# Patient Record
Sex: MALE | Race: BLACK OR AFRICAN AMERICAN | NOT HISPANIC OR LATINO | Employment: FULL TIME | URBAN - METROPOLITAN AREA
[De-identification: names, ages, dates, MRNs, and addresses within clinical notes are randomized per-mention and may not be internally consistent; named-entity substitution may affect disease eponyms.]

---

## 2017-05-15 ENCOUNTER — PATIENT OUTREACH (OUTPATIENT)
Dept: ADMINISTRATIVE | Facility: HOSPITAL | Age: 57
End: 2017-05-15
Payer: COMMERCIAL

## 2017-05-15 ENCOUNTER — OFFICE VISIT (OUTPATIENT)
Dept: INTERNAL MEDICINE | Facility: CLINIC | Age: 57
End: 2017-05-15
Payer: COMMERCIAL

## 2017-05-15 ENCOUNTER — TELEPHONE (OUTPATIENT)
Dept: INTERNAL MEDICINE | Facility: CLINIC | Age: 57
End: 2017-05-15

## 2017-05-15 VITALS
BODY MASS INDEX: 27.14 KG/M2 | DIASTOLIC BLOOD PRESSURE: 104 MMHG | SYSTOLIC BLOOD PRESSURE: 172 MMHG | WEIGHT: 204.81 LBS | HEART RATE: 87 BPM | TEMPERATURE: 97 F | RESPIRATION RATE: 16 BRPM | OXYGEN SATURATION: 99 % | HEIGHT: 73 IN

## 2017-05-15 DIAGNOSIS — I73.9 PVD (PERIPHERAL VASCULAR DISEASE): ICD-10-CM

## 2017-05-15 DIAGNOSIS — Z99.2 PERITONEAL DIALYSIS CATHETER IN PLACE: ICD-10-CM

## 2017-05-15 DIAGNOSIS — I12.0 HYPERTENSION DUE TO END STAGE RENAL DISEASE ON DIALYSIS: ICD-10-CM

## 2017-05-15 DIAGNOSIS — N18.5 CHRONIC KIDNEY DISEASE, STAGE 5: Primary | ICD-10-CM

## 2017-05-15 DIAGNOSIS — Z99.2 HYPERTENSION DUE TO END STAGE RENAL DISEASE ON DIALYSIS: Primary | ICD-10-CM

## 2017-05-15 DIAGNOSIS — Z99.2 HYPERTENSION DUE TO END STAGE RENAL DISEASE ON DIALYSIS: ICD-10-CM

## 2017-05-15 DIAGNOSIS — N18.6 HYPERTENSION DUE TO END STAGE RENAL DISEASE ON DIALYSIS: ICD-10-CM

## 2017-05-15 DIAGNOSIS — N18.6 HYPERTENSION DUE TO END STAGE RENAL DISEASE ON DIALYSIS: Primary | ICD-10-CM

## 2017-05-15 DIAGNOSIS — B18.2 CHRONIC HEPATITIS C WITHOUT HEPATIC COMA: ICD-10-CM

## 2017-05-15 DIAGNOSIS — Z55.0 ILLITERATE: ICD-10-CM

## 2017-05-15 DIAGNOSIS — I12.0 HYPERTENSION DUE TO END STAGE RENAL DISEASE ON DIALYSIS: Primary | ICD-10-CM

## 2017-05-15 DIAGNOSIS — Z72.0 TOBACCO USE: ICD-10-CM

## 2017-05-15 PROCEDURE — 99204 OFFICE O/P NEW MOD 45 MIN: CPT | Mod: S$GLB,,, | Performed by: FAMILY MEDICINE

## 2017-05-15 PROCEDURE — 99999 PR PBB SHADOW E&M-EST. PATIENT-LVL III: CPT | Mod: PBBFAC,,, | Performed by: FAMILY MEDICINE

## 2017-05-15 PROCEDURE — 1160F RVW MEDS BY RX/DR IN RCRD: CPT | Mod: S$GLB,,, | Performed by: FAMILY MEDICINE

## 2017-05-15 RX ORDER — ELBASVIR AND GRAZOPREVIR 50; 100 MG/1; MG/1
1 TABLET, FILM COATED ORAL DAILY
Refills: 2 | COMMUNITY
Start: 2017-05-03 | End: 2017-07-25

## 2017-05-15 RX ORDER — CALCITRIOL 0.5 UG/1
0.5 CAPSULE ORAL
COMMUNITY
End: 2017-05-29 | Stop reason: DRUGHIGH

## 2017-05-15 RX ORDER — IBUPROFEN 200 MG
1 TABLET ORAL DAILY
Qty: 30 PATCH | Refills: 5 | Status: SHIPPED | OUTPATIENT
Start: 2017-05-15 | End: 2017-12-18 | Stop reason: ALTCHOICE

## 2017-05-15 RX ORDER — SPIRONOLACTONE 50 MG/1
50 TABLET, FILM COATED ORAL
COMMUNITY
End: 2018-02-01

## 2017-05-15 RX ORDER — LOSARTAN POTASSIUM 100 MG/1
100 TABLET ORAL DAILY
COMMUNITY
End: 2018-02-01 | Stop reason: ALTCHOICE

## 2017-05-15 RX ORDER — HYDRALAZINE HYDROCHLORIDE 10 MG/1
10 TABLET, FILM COATED ORAL 3 TIMES DAILY
Qty: 90 TABLET | Refills: 11 | Status: SHIPPED | OUTPATIENT
Start: 2017-05-15 | End: 2017-05-29 | Stop reason: DRUGHIGH

## 2017-05-15 SDOH — SOCIAL DETERMINANTS OF HEALTH (SDOH): ILITERACY AND LOW LEVEL LITERACY: Z55.0

## 2017-05-15 NOTE — PROGRESS NOTES
Subjective:       Patient ID: Rom Miner Jr. is a 57 y.o. male.    Chief Complaint: Establish Care    HPI  Here today with his wife to establish care.  She pointed out to me from the beginning that she needed to be in the exam room with him to hear instructions because he is unable to read and write and sometimes doesn't work for a with the doctors tell him.   He has a significant history for end-stage renal disease and is on peritoneal dialysis. Has been on dialysis 2 years.  Opted not to do hemodialysis due to the fact that he still works as a night watchman.  One of his main concerns at this time is the fact that he has a lot of cramps in his legs and pain when he standing for extended amount of times and it seems all those difficult to tease out, that he has pain with activity and it gets better whenever he rests.  He has never had any type of workup into the circulation of his lower extremities.  He does continue to smoke in the past had tried Chantix but had significant side effects with that.  Continues to have elevated blood pressure and there have not been any adjustments to his blood pressure medications and quite some time now.  He is adherent to what is listed here    Getting Hep C treatment with [facility]    No family history on file.    Current Outpatient Prescriptions:     amlodipine (NORVASC) 10 MG tablet, Take 10 mg by mouth once daily., Disp: , Rfl:     B&C/FERROUS FUM/FA/D3/ZINC OX (PRORENAL ORAL), Take by mouth., Disp: , Rfl:     BYSTOLIC 10 mg Tab, Take 10 mg by mouth once daily., Disp: , Rfl: 3    calcitRIOL (ROCALTROL) 0.5 MCG Cap, Take 0.5 mcg by mouth once daily., Disp: , Rfl:     calcitRIOL (ROCALTROL) 0.5 MCG Cap, Take 0.5 mcg by mouth., Disp: , Rfl:     hydrocodone-acetaminophen 10-325mg (NORCO)  mg Tab, Take 1 tablet by mouth every 6 (six) hours as needed., Disp: 20 tablet, Rfl: 0    ibuprofen (ADVIL,MOTRIN) 600 MG tablet, Take 1 tablet (600 mg total) by mouth every 8  "(eight) hours as needed for Pain., Disp: 30 tablet, Rfl: 0    losartan (COZAAR) 100 MG tablet, Take 100 mg by mouth once daily., Disp: , Rfl:     magnesium oxide (MAG-OX) 400 mg tablet, Take 400 mg by mouth once daily., Disp: , Rfl:     mirtazapine (REMERON) 15 MG tablet, Take 15 mg by mouth every evening., Disp: , Rfl:     sevelamer carbonate (RENVELA) 800 mg Tab, Take 800 mg by mouth 3 (three) times daily with meals., Disp: , Rfl:     spironolactone (ALDACTONE) 50 MG tablet, Take 50 mg by mouth., Disp: , Rfl:     ZEPATIER  mg Tab, Take 1 tablet by mouth once daily., Disp: , Rfl: 2    Review of Systems   Constitutional: Negative for chills, fever and unexpected weight change.   HENT: Negative for congestion, ear pain, sore throat and voice change.    Eyes: Negative for pain and visual disturbance.   Respiratory: Negative for cough, shortness of breath and wheezing.    Cardiovascular: Positive for leg swelling (and pain/cramping from time to time). Negative for chest pain.   Gastrointestinal: Negative for abdominal pain, nausea and vomiting.   Genitourinary: Negative for difficulty urinating.   Musculoskeletal: Positive for back pain.   Skin: Negative for rash.   Neurological: Negative for dizziness and speech difficulty.   Hematological: Does not bruise/bleed easily.   Psychiatric/Behavioral: Negative for sleep disturbance and suicidal ideas. The patient is not nervous/anxious.        Objective:   BP (!) 172/104 (BP Location: Right arm, Patient Position: Sitting, BP Method: Automatic)  Pulse 87  Temp 96.5 °F (35.8 °C) (Tympanic)   Resp 16  Ht 6' 1" (1.854 m)  Wt 92.9 kg (204 lb 12.9 oz)  SpO2 99%  BMI 27.02 kg/m2     Physical Exam   Constitutional: He is oriented to person, place, and time. He appears well-developed and well-nourished. No distress.   HENT:   Head: Normocephalic and atraumatic.   Nose: Nose normal.   Eyes: Conjunctivae and EOM are normal. Pupils are equal, round, and reactive to " light. Right eye exhibits no discharge. Left eye exhibits no discharge.   Neck: No thyromegaly present.   Cardiovascular: Normal rate, regular rhythm and normal heart sounds.    No murmur heard.  Decreased pulses of feet.  I was unable to palpate dorsalis pedis or tibialis pulses   Pulmonary/Chest: Effort normal and breath sounds normal. No respiratory distress. He has no wheezes.   Abdominal: Soft. He exhibits no distension.   Musculoskeletal: He exhibits no edema.   Neurological: He is alert and oriented to person, place, and time.   Skin: Skin is warm. No rash noted. He is not diaphoretic.   Psychiatric: He has a normal mood and affect. His behavior is normal.   Vitals reviewed.      Assessment & Plan     1. Chronic kidney disease, stage 5  He continues to use peritoneal dialysis and was told that he uses this due to the fact that he works and feels that it would be easier than hemodialysis.  Chronic kidney disease is secondary to hypertension.  He continues to have difficult to control hypertension although he says at home usually his systolic number is around 140.    2. Hypertension due to end stage renal disease on dialysis  He continues to be on multiple therapy.  We'll add a small dose of hydralazine to see if this helps with his blood pressure.  We'll have him follow-up in 2 weeks.  Explained to him that ideally his blood pressure would be in the 110 to 120s    3. Chronic hepatitis C without hepatic coma  Continue follow-up with gastroenterology for treatment monitoring.    4. Tobacco use  Expressed the need to quit smoking completely.  He is amenable to trying the nicotine patch.  Had problems with Chantix in the past.    5. PVD (peripheral vascular disease)  Suspect that some of his leg symptoms or from being on dialysis but also he has a history of someone who would likely have peripheral vascular disease with smoking and renal disease as well as exam suggestive of decreased blood flow.  We will get  JUANITA  - Cardiology Lab JUANITA Resting, Lower Extremities; Future    6. Peritoneal dialysis catheter in place  Continue follow-up with renal.

## 2017-05-15 NOTE — TELEPHONE ENCOUNTER
----- Message from Royer Ta DO sent at 5/15/2017 11:40 AM CDT -----  Please call and have kati start on 1/2 (5mg) of the 10mg hydralazine three times a day.

## 2017-05-15 NOTE — LETTER
May 15, 2017    Rom Miner JrBrenda  [address]  [facility] [address]             [facility]  [address]  [facility] [address]  Phone: [phone] Dear Mr. Miner:    [facility] is committed to your overall health.  To help you get the most out of each of your visits, we will review your information to make sure you are up to date on all of your recommended tests and/or procedures.      Royer Ta DO has found that you may be due for   Health Maintenance Due   Topic    Lipid Panel     TETANUS VACCINE     Pneumococcal PPSV23 (Medium Risk) (1)    Colonoscopy         If you have had any of the above done at another facility, please bring the records or information with you so that your record at [facility] will be complete.    If you are currently taking medication, please bring it with you to your appointment for review.    We will be happy to assist you with scheduling any necessary appointments or you may contact the [facility] appointment desk at [phone] to schedule at your convenience.     Thank you for choosing [facility] for your healthcare needs.  If you have any questions or concerns, please don't hesitate to call.    Sincerely,  Huyen LOGAN LPN Care Coordinator  [facility]  [phone]

## 2017-05-15 NOTE — PATIENT INSTRUCTIONS
Kicking the Smoking Habit  If you smoke, quitting is one of the best changes you can make for your heart and your overall health. Your risk of heart attack goes down within one day of putting out that last cigarette. As you go longer without smoking, your risk goes down even more. Quitting isnt easy, but millions of people have done it. You can, too. Its never too late to quit.  Getting started  Boost your chances of success by deciding on your quit plan. Your health care provider and cardiac rehab team can help you develop this plan. Even if youve already quit, its easy to slip back into smoking.  Your plan can help you avoid and recover from relapse.  In any case, start by setting a date to quit within a month, and do it.    Keys to your quit plan  · Talk to your healthcare provider about prescription medicines and nicotine replacement products that help stop the urge to smoke.   · Join a support group or quit smoking program. Talking with others about the challenges of quitting can help you get through them.  · Ask other smokers in your household to quit with you.  · Look for the cues in your life that you associate with smoking and avoid them.  Track your triggers  What gives you that J-vrwy-d-cigarette feeling? List all the situations that make you want a cigarette. Then think of other ways to deal with these situations. Here are some examples:  Situation How I'll handle it   Finishing a meal Get up from the table and take a walk   Having an argument Find a quiet place and breathe deeply   Feeling lonely or bored Call a friend to talk         Tips for quitting successfully  · List the benefits of quitting such as reducing heart risks and saving money. Keep this list and review it whenever you feel like smoking.  · Get support. Let your friends know you may call them to chat when you have an urge to smoke.  · If youve tried to quit before without success, this time avoid the triggers that may cause  the relapse.  · Make the most of slip-ups. Try to learn from them, and then get back on track.  · Be accountable to your friends and your calendar so that you stay on track.  For family and friends  · Be supportive and patient. Quitting smoking can be difficult and stressful.  · If you smoke, nows a great time to quit. Even if you dont quit, never smoke around your loved one. Secondhand smoke is dangerous to his or her heart.  · The best goals are accomplished in teams. Remember that when your loved one states he or she wants to stop smoking.  Date Last Reviewed: 7/1/2016  © 9377-3554 [employer]. [address] [place] [address], [place] [address]. All rights reserved. This information is not intended as a substitute for professional medical care. Always follow your healthcare professional's instructions.

## 2017-05-15 NOTE — MR AVS SNAPSHOT
[facility]  [address]  [place] [address]  Phone: [phone]                  Rom Miner Jr.   5/15/2017 10:40 AM   Office Visit    Description:  Male : 1960   Provider:  Royer Ta DO   Department:  [facility] Internal Medicine           Reason for Visit     Establish Care           Diagnoses this Visit        Comments    Chronic kidney disease, stage 5    -  Primary     Hypertension due to end stage renal disease on dialysis         Chronic hepatitis C without hepatic coma         Tobacco use         PVD (peripheral vascular disease)         Peritoneal dialysis catheter in place                To Do List           Future Appointments        Provider Department Dept Phone    2017 9:00 AM CARDIOVASCULAR, [facility] - Cardiology [phone]    2017 11:00 AM Royer Ta DO [facility] [phone]      Goals (5 Years of Data)     None      Follow-Up and Disposition     Return in about 2 weeks (around 2017).       These Medications        Disp Refills Start End    hydrALAZINE (APRESOLINE) 10 MG tablet 90 tablet 11 5/15/2017 5/15/2018    Take 1 tablet (10 mg total) by mouth 3 (three) times daily. - Oral    Pharmacy: [facility] [place] [address] Ph #: [phone]       nicotine (NICODERM CQ) 14 mg/24 hr 30 patch 5 5/15/2017     Place 1 patch onto the skin once daily. - Transdermal    [place] [facility] [place] [address] Ph #: [phone]         [facility]     [facility] Nurse Care Line -  Assistance  Unless otherwise directed by your provider, please contact [facility], our nurse care line that is available for  assistance.     Registered nurses in the [facility] provide: appointment scheduling, clinical advisement, health education, and other advisory services.  Call: [phone] (toll  free)               Medications           Message regarding Medications     Verify the changes and/or additions to your medication regime listed below are the same as discussed with your clinician today.  If any of these changes or additions are incorrect, please notify your healthcare provider.        START taking these NEW medications        Refills    hydrALAZINE (APRESOLINE) 10 MG tablet 11    Sig: Take 1 tablet (10 mg total) by mouth 3 (three) times daily.    Class: Normal    Route: Oral    nicotine (NICODERM CQ) 14 mg/24 hr 5    Sig: Place 1 patch onto the skin once daily.    Class: Normal    Route: Transdermal           Verify that the below list of medications is an accurate representation of the medications you are currently taking.  If none reported, the list may be blank. If incorrect, please contact your healthcare provider. Carry this list with you in case of emergency.           Current Medications     amlodipine (NORVASC) 10 MG tablet Take 10 mg by mouth once daily.    B&C/FERROUS FUM/FA/D3/ZINC OX (PRORENAL ORAL) Take by mouth.    BYSTOLIC 10 mg Tab Take 10 mg by mouth once daily.    calcitRIOL (ROCALTROL) 0.5 MCG Cap Take 0.5 mcg by mouth once daily.    calcitRIOL (ROCALTROL) 0.5 MCG Cap Take 0.5 mcg by mouth.    hydrocodone-acetaminophen 10-325mg (NORCO)  mg Tab Take 1 tablet by mouth every 6 (six) hours as needed.    ibuprofen (ADVIL,MOTRIN) 600 MG tablet Take 1 tablet (600 mg total) by mouth every 8 (eight) hours as needed for Pain.    losartan (COZAAR) 100 MG tablet Take 100 mg by mouth once daily.    magnesium oxide (MAG-OX) 400 mg tablet Take 400 mg by mouth once daily.    mirtazapine (REMERON) 15 MG tablet Take 15 mg by mouth every evening.    sevelamer carbonate (RENVELA) 800 mg Tab Take 800 mg by mouth 3 (three) times daily with meals.    spironolactone (ALDACTONE) 50 MG tablet Take 50 mg by mouth.    ZEPATIER  mg Tab Take 1 tablet by mouth once daily.    hydrALAZINE  "(APRESOLINE) 10 MG tablet Take 1 tablet (10 mg total) by mouth 3 (three) times daily.    nicotine (NICODERM CQ) 14 mg/24 hr Place 1 patch onto the skin once daily.           Clinical Reference Information           Your Vitals Were     BP Pulse Temp Resp    172/104 (BP Location: Right arm, Patient Position: Sitting, BP Method: Automatic) 87 96.5 °F (35.8 °C) (Tympanic) 16    Height Weight SpO2 BMI    6' 1" (1.854 m) 92.9 kg (204 lb 12.9 oz) 99% 27.02 kg/m2      Blood Pressure          Most Recent Value    BP  (!)  172/104      Allergies as of 5/15/2017     No Known Allergies      Immunizations Administered on Date of Encounter - 5/15/2017     None      Orders Placed During Today's Visit     Future Labs/Procedures Expected by Expires    Cardiology Lab JUANITA Resting, Lower Extremities  As directed 5/15/2018      [facility] Sign-Up     Activating your [facility] account is as easy as 1-2-3!     1) Visit [email or web address], select Sign Up Now, enter this activation code and your date of birth, then select Next.  [email or web address]  Expires: 6/29/2017 11:31 AM      2) Create a username and password to use when you visit [facility] in the future and select a security question in case you lose your password and select Next.    3) Enter your e-mail address and click Sign Up!    Additional Information  If you have questions, please e-mail [email or web address] or call [phone] to talk to our [facility] staff. Remember, [facility] is NOT to be used for urgent needs. For medical emergencies, dial 911.         Instructions      Kicking the Smoking Habit  If you smoke, quitting is one of the best changes you can make for your heart and your overall health. Your risk of heart attack goes down within one day of putting out that last cigarette. As you go longer without smoking, your risk goes down even more. Quitting isnt easy, but millions of people have done it. You can, too. Its never too late to quit.  Getting started  Boost your " chances of success by deciding on your quit plan. Your health care provider and cardiac rehab team can help you develop this plan. Even if youve already quit, its easy to slip back into smoking.  Your plan can help you avoid and recover from relapse.  In any case, start by setting a date to quit within a month, and do it.    Keys to your quit plan  · Talk to your healthcare provider about prescription medicines and nicotine replacement products that help stop the urge to smoke.   · Join a support group or quit smoking program. Talking with others about the challenges of quitting can help you get through them.  · Ask other smokers in your household to quit with you.  · Look for the cues in your life that you associate with smoking and avoid them.  Track your triggers  What gives you that O-wghi-t-cigarette feeling? List all the situations that make you want a cigarette. Then think of other ways to deal with these situations. Here are some examples:  Situation How I'll handle it   Finishing a meal Get up from the table and take a walk   Having an argument Find a quiet place and breathe deeply   Feeling lonely or bored Call a friend to talk         Tips for quitting successfully  · List the benefits of quitting such as reducing heart risks and saving money. Keep this list and review it whenever you feel like smoking.  · Get support. Let your friends know you may call them to chat when you have an urge to smoke.  · If youve tried to quit before without success, this time avoid the triggers that may cause the relapse.  · Make the most of slip-ups. Try to learn from them, and then get back on track.  · Be accountable to your friends and your calendar so that you stay on track.  For family and friends  · Be supportive and patient. Quitting smoking can be difficult and stressful.  · If you smoke, nows a great time to quit. Even if you dont quit, never smoke around your loved one. Secondhand smoke is dangerous to  his or her heart.  · The best goals are accomplished in teams. Remember that when your loved one states he or she wants to stop smoking.  Date Last Reviewed: 7/1/2016  © 5863-1924 [employer] [address] [place] [address], [place] [address]. All rights reserved. This information is not intended as a substitute for professional medical care. Always follow your healthcare professional's instructions.             Smoking Cessation     If you would like to quit smoking:   You may be eligible for free services if you are a [place] resident and started smoking cigarettes before September 1, 1988.  Call the Smoking Cessation Trust (SCT) toll free at (890) 587-3841 or (585) 116-8256.   Call 8-800-QUIT-NOW if you do not meet the above criteria.   Contact us via email: [email or web address]   View our website for more information: [email or web address]        Language Assistance Services     ATTENTION: Language assistance services are available, free of charge. Please call [phone].      ATENCIÓN: Si habla harsh, tiene a gonzalez disposición servicios gratuitos de asistencia lingüística. Llame al [phone].     CHÚ Ý: N?u b?n nói Ti?ng Vi?t, có các d?ch v? h? tr? ngôn ng? mi?n phí dành cho b?n. G?i s? [phone].         [facility] complies with applicable Federal civil rights laws and does not discriminate on the basis of race, color, national origin, age, disability, or sex.

## 2017-05-16 RX ORDER — LEVOCETIRIZINE DIHYDROCHLORIDE 5 MG/1
TABLET, FILM COATED ORAL
Qty: 30 TABLET | Refills: 5 | Status: SHIPPED | OUTPATIENT
Start: 2017-05-16 | End: 2017-12-15 | Stop reason: ALTCHOICE

## 2017-05-18 ENCOUNTER — TELEPHONE (OUTPATIENT)
Dept: INTERNAL MEDICINE | Facility: CLINIC | Age: 57
End: 2017-05-18

## 2017-05-18 NOTE — TELEPHONE ENCOUNTER
----- Message from Dm MORAES Frisard sent at 5/18/2017  2:48 PM CDT -----  Contact: same  Patient called in and needs to reschedule the EKG which is now scheduled for tomorrow Friday 5/19/17.  Patient would like to reschedule for either 5/25 or 5/26.    Patient call back number is: [phone]

## 2017-05-19 ENCOUNTER — HOSPITAL ENCOUNTER (OUTPATIENT)
Dept: CARDIOLOGY | Facility: CLINIC | Age: 57
Discharge: HOME | End: 2017-05-19
Payer: COMMERCIAL

## 2017-05-19 DIAGNOSIS — I73.9 PVD (PERIPHERAL VASCULAR DISEASE): ICD-10-CM

## 2017-05-19 PROCEDURE — 93922 UPR/L XTREMITY ART 2 LEVELS: CPT | Mod: S$GLB,,, | Performed by: INTERNAL MEDICINE

## 2017-05-21 LAB — VASCULAR ANKLE BRACHIAL INDEX (ABI) LEFT: 1.5 (ref 0.9–1.2)

## 2017-05-25 RX ORDER — ATORVASTATIN CALCIUM 20 MG/1
20 TABLET, FILM COATED ORAL DAILY
Qty: 90 TABLET | Refills: 3 | Status: SHIPPED | OUTPATIENT
Start: 2017-05-25 | End: 2017-12-18

## 2017-05-25 NOTE — PROGRESS NOTES
No further testing needed at this time. Should be on aspirin and statin medication. 81mg aspirin and I sent lipitor script to pharmacy. Stop smoking

## 2017-05-26 ENCOUNTER — TELEPHONE (OUTPATIENT)
Dept: INTERNAL MEDICINE | Facility: CLINIC | Age: 57
End: 2017-05-26

## 2017-05-26 NOTE — TELEPHONE ENCOUNTER
----- Message from Royer Ta DO sent at 5/25/2017  3:42 PM CDT -----  No further testing needed at this time. Should be on aspirin and statin medication. 81mg aspirin and I sent lipitor script to pharmacy. Stop smoking

## 2017-05-29 ENCOUNTER — OFFICE VISIT (OUTPATIENT)
Dept: INTERNAL MEDICINE | Facility: CLINIC | Age: 57
End: 2017-05-29
Payer: COMMERCIAL

## 2017-05-29 ENCOUNTER — TELEPHONE (OUTPATIENT)
Dept: INTERNAL MEDICINE | Facility: CLINIC | Age: 57
End: 2017-05-29

## 2017-05-29 VITALS
WEIGHT: 215.38 LBS | TEMPERATURE: 98 F | DIASTOLIC BLOOD PRESSURE: 92 MMHG | OXYGEN SATURATION: 97 % | HEIGHT: 73 IN | SYSTOLIC BLOOD PRESSURE: 156 MMHG | HEART RATE: 72 BPM | BODY MASS INDEX: 28.54 KG/M2 | RESPIRATION RATE: 18 BRPM

## 2017-05-29 DIAGNOSIS — Z72.0 TOBACCO USE: ICD-10-CM

## 2017-05-29 DIAGNOSIS — Z99.2 HYPERTENSION DUE TO END STAGE RENAL DISEASE ON DIALYSIS: ICD-10-CM

## 2017-05-29 DIAGNOSIS — I12.0 HYPERTENSION DUE TO END STAGE RENAL DISEASE ON DIALYSIS: ICD-10-CM

## 2017-05-29 DIAGNOSIS — I73.9 PVD (PERIPHERAL VASCULAR DISEASE): Primary | ICD-10-CM

## 2017-05-29 DIAGNOSIS — R60.9 DEPENDENT EDEMA: ICD-10-CM

## 2017-05-29 DIAGNOSIS — N18.6 HYPERTENSION DUE TO END STAGE RENAL DISEASE ON DIALYSIS: ICD-10-CM

## 2017-05-29 DIAGNOSIS — R60.9 DEPENDENT EDEMA: Primary | ICD-10-CM

## 2017-05-29 DIAGNOSIS — N18.5 CHRONIC KIDNEY DISEASE, STAGE 5: ICD-10-CM

## 2017-05-29 PROCEDURE — 99999 PR PBB SHADOW E&M-EST. PATIENT-LVL III: CPT | Mod: PBBFAC,,, | Performed by: FAMILY MEDICINE

## 2017-05-29 PROCEDURE — 99214 OFFICE O/P EST MOD 30 MIN: CPT | Mod: S$GLB,,, | Performed by: FAMILY MEDICINE

## 2017-05-29 RX ORDER — GENTAMICIN SULFATE 1 MG/G
CREAM TOPICAL
Refills: 2 | COMMUNITY
Start: 2017-05-18 | End: 2018-10-03

## 2017-05-29 RX ORDER — FERRIC CITRATE 210 MG/1
TABLET, COATED ORAL
Refills: 11 | COMMUNITY
Start: 2017-05-17 | End: 2018-10-03

## 2017-05-29 RX ORDER — HYDRALAZINE HYDROCHLORIDE 25 MG/1
25 TABLET, FILM COATED ORAL 3 TIMES DAILY
Refills: 3 | COMMUNITY
Start: 2017-05-17 | End: 2018-07-31 | Stop reason: SDUPTHER

## 2017-05-29 NOTE — PATIENT INSTRUCTIONS
Keep follow up with kidney doctor for further management of blood pressure.    Wear compression stockings as much as able when on feet to help with swelling and leg pain.    Take daily 81mg of asprin.

## 2017-05-29 NOTE — TELEPHONE ENCOUNTER
Pt wife call requesting order for compression stocking be sent to [facility] in [place], please reprint as i couldn't locate it to reprint. Thanks

## 2017-05-29 NOTE — TELEPHONE ENCOUNTER
No, it was faxed [facility] through [facility]. Fax: [phone] Phone: [phone]. Pt declined having it faxed to [facility].

## 2017-05-29 NOTE — PROGRESS NOTES
Subjective:       Patient ID: Rom Miner Jr. is a 57 y.o. male.    Chief Complaint: Edema (c/o edema and pain to BLE. ) and Medication Dose Change (Nephro increased Hydralzine to 25mg  TID)    HPI  Here today to follow-up from recent visit where he was seen with elevated blood pressure.  At the last visit I started him on low dose of hydralazine and since then he has been seen by nephrology and they increased the dosing to 25 mg 3 times a day.  He feels okay with that treatment.  His main concern at this time is leg cramping and swelling.  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  He does not use compression stockings.  He tries to elevate his feet when he can then he says this helps sometimes with the swelling but not so much with the cramping.  Since the last visit he has had JUANITA testing which showed noncompressible vasculature however I discussed with cardiology and the recommendation was for medical therapy.  He is on statin.  I recommended that he start on aspirin therapy.  He has quit smoking and is currently on nicotine patch.    Family History   Problem Relation Age of Onset    No Known Problems Mother     Cirrhosis Father        Current Outpatient Prescriptions:     amlodipine (NORVASC) 10 MG tablet, Take 10 mg by mouth once daily., Disp: , Rfl:     atorvastatin (LIPITOR) 20 MG tablet, Take 1 tablet (20 mg total) by mouth once daily., Disp: 90 tablet, Rfl: 3    AURYXIA 210 mg iron Tab, TAKE 2 TABLETS BY MOUTH THREE TIMES A DAY WITH MEALS AND TAKE 1 TABLET BY MOUTH TWO TIMES A DAY WITH SNACKS, Disp: , Rfl: 11    B&C/FERROUS FUM/FA/D3/ZINC OX (PRORENAL ORAL), Take by mouth., Disp: , Rfl:     BYSTOLIC 10 mg Tab, Take 10 mg by mouth once daily., Disp: , Rfl: 3    calcitRIOL (ROCALTROL) 0.5 MCG Cap, Take 0.5 mcg by mouth once daily., Disp: , Rfl:     gentamicin (GARAMYCIN) 0.1 % cream, APPLY TO EXIT SITE ONCE DAILY, Disp: , Rfl: 2    hydrALAZINE (APRESOLINE) 25 MG tablet,  Take 25 mg by mouth 3 (three) times daily., Disp: , Rfl: 3    hydrocodone-acetaminophen 10-325mg (NORCO)  mg Tab, Take 1 tablet by mouth every 6 (six) hours as needed., Disp: 20 tablet, Rfl: 0    ibuprofen (ADVIL,MOTRIN) 600 MG tablet, Take 1 tablet (600 mg total) by mouth every 8 (eight) hours as needed for Pain., Disp: 30 tablet, Rfl: 0    levocetirizine (XYZAL) 5 MG tablet, TAKE ONE TABLET BY MOUTH ONCE DAILY AS NEEDED FOR ALLERGIES, Disp: 30 tablet, Rfl: 5    losartan (COZAAR) 100 MG tablet, Take 100 mg by mouth once daily., Disp: , Rfl:     magnesium oxide (MAG-OX) 400 mg tablet, Take 400 mg by mouth once daily., Disp: , Rfl:     mirtazapine (REMERON) 15 MG tablet, Take 15 mg by mouth every evening., Disp: , Rfl:     nicotine (NICODERM CQ) 14 mg/24 hr, Place 1 patch onto the skin once daily., Disp: 30 patch, Rfl: 5    spironolactone (ALDACTONE) 50 MG tablet, Take 50 mg by mouth., Disp: , Rfl:     ZEPATIER  mg Tab, Take 1 tablet by mouth once daily., Disp: , Rfl: 2    Review of Systems   Constitutional: Negative for chills, fever and unexpected weight change.   HENT: Negative for congestion, ear pain, sore throat and voice change.    Eyes: Negative for pain and visual disturbance.   Respiratory: Negative for cough, shortness of breath and wheezing.    Cardiovascular: Positive for leg swelling (and pain/cramping from time to time). Negative for chest pain.   Gastrointestinal: Negative for abdominal pain, nausea and vomiting.   Genitourinary: Negative for difficulty urinating.   Musculoskeletal: Positive for back pain.   Skin: Negative for rash.   Neurological: Negative for dizziness and speech difficulty.   Hematological: Does not bruise/bleed easily.   Psychiatric/Behavioral: Negative for sleep disturbance and suicidal ideas. The patient is not nervous/anxious.        Objective:   BP (!) 156/92 (BP Location: Right arm, Patient Position: Sitting, BP Method: Automatic)   Pulse 72   Temp 97.8  "°F (36.6 °C) (Tympanic)   Resp 18   Ht 6' 1.2" (1.859 m)   Wt 97.7 kg (215 lb 6.2 oz)   SpO2 97%   BMI 28.26 kg/m²      Physical Exam   Constitutional: He is oriented to person, place, and time. He appears well-developed and well-nourished. No distress.   HENT:   Head: Normocephalic and atraumatic.   Nose: Nose normal.   Eyes: Conjunctivae and EOM are normal. Pupils are equal, round, and reactive to light. Right eye exhibits no discharge. Left eye exhibits no discharge.   Neck: No thyromegaly present.   Cardiovascular: Normal rate, regular rhythm and normal heart sounds.    No murmur heard.  Decreased pulses of feet.  I was unable to palpate dorsalis pedis or tibialis pulses   Pulmonary/Chest: Effort normal and breath sounds normal. No respiratory distress. He has no wheezes.   Abdominal: Soft. He exhibits no distension.   Musculoskeletal: He exhibits edema (1+ pitting edema of both legs).   Neurological: He is alert and oriented to person, place, and time. Coordination normal.   Skin: Skin is warm and dry. No rash noted. He is not diaphoretic.   Psychiatric: He has a normal mood and affect. His behavior is normal.   Vitals reviewed.      Assessment & Plan     1. PVD (peripheral vascular disease)  Discussed the results of recent JUANITA testing.  Emphasized continued smoking cessation as well as use of aspirin and statin medication.  - COMPRESSION STOCKINGS    2. Chronic kidney disease, stage 5  Continue follow-up with renal doctor.  Agree with increase of hydralazine to 25 mg 3 times daily.  Continue other medications.    3. Hypertension due to end stage renal disease on dialysis  Has follow-up within the next couple weeks with nephrology.  Emphasized the importance of this to help manage blood pressure    4. Dependent edema  Recommended use of compression stockings and for him to elevate his feet when possible  - COMPRESSION STOCKINGS    5. Tobacco use  Congratulated him on smoking cessation.  He has not smoked " in several weeks and continues to use nicotine patch

## 2017-07-05 ENCOUNTER — PATIENT OUTREACH (OUTPATIENT)
Dept: ADMINISTRATIVE | Facility: HOSPITAL | Age: 57
End: 2017-07-05

## 2017-07-25 ENCOUNTER — TELEPHONE (OUTPATIENT)
Dept: INTERNAL MEDICINE | Facility: CLINIC | Age: 57
End: 2017-07-25

## 2017-07-25 ENCOUNTER — LAB VISIT (OUTPATIENT)
Dept: LAB | Facility: HOSPITAL | Age: 57
End: 2017-07-25
Attending: FAMILY MEDICINE
Payer: COMMERCIAL

## 2017-07-25 ENCOUNTER — OFFICE VISIT (OUTPATIENT)
Dept: INTERNAL MEDICINE | Facility: CLINIC | Age: 57
End: 2017-07-25
Payer: COMMERCIAL

## 2017-07-25 VITALS
BODY MASS INDEX: 27.93 KG/M2 | SYSTOLIC BLOOD PRESSURE: 142 MMHG | OXYGEN SATURATION: 96 % | RESPIRATION RATE: 18 BRPM | DIASTOLIC BLOOD PRESSURE: 84 MMHG | TEMPERATURE: 96 F | WEIGHT: 210.75 LBS | HEIGHT: 73 IN | HEART RATE: 71 BPM

## 2017-07-25 DIAGNOSIS — M79.662 PAIN OF LEFT CALF: Primary | ICD-10-CM

## 2017-07-25 DIAGNOSIS — I73.9 PVD (PERIPHERAL VASCULAR DISEASE): ICD-10-CM

## 2017-07-25 DIAGNOSIS — B18.2 CHRONIC HEPATITIS C WITHOUT HEPATIC COMA: ICD-10-CM

## 2017-07-25 DIAGNOSIS — R25.2 LEG CRAMPS: Primary | ICD-10-CM

## 2017-07-25 DIAGNOSIS — Z99.2 STAGE 5 CHRONIC KIDNEY DISEASE ON CHRONIC DIALYSIS: ICD-10-CM

## 2017-07-25 DIAGNOSIS — N18.6 STAGE 5 CHRONIC KIDNEY DISEASE ON CHRONIC DIALYSIS: ICD-10-CM

## 2017-07-25 DIAGNOSIS — R79.89 D-DIMER, ELEVATED: ICD-10-CM

## 2017-07-25 DIAGNOSIS — R25.2 LEG CRAMPS: ICD-10-CM

## 2017-07-25 LAB
ALBUMIN SERPL BCP-MCNC: 2.6 G/DL
ALP SERPL-CCNC: 36 U/L
ALT SERPL W/O P-5'-P-CCNC: 29 U/L
ANION GAP SERPL CALC-SCNC: 15 MMOL/L
AST SERPL-CCNC: 17 U/L
BILIRUB SERPL-MCNC: 0.4 MG/DL
BUN SERPL-MCNC: 91 MG/DL
CALCIUM SERPL-MCNC: 9.1 MG/DL
CHLORIDE SERPL-SCNC: 105 MMOL/L
CO2 SERPL-SCNC: 22 MMOL/L
CREAT SERPL-MCNC: 21.4 MG/DL
D DIMER PPP IA.FEU-MCNC: 0.93 MG/L FEU
EST. GFR  (AFRICAN AMERICAN): 2.3 ML/MIN/1.73 M^2
EST. GFR  (NON AFRICAN AMERICAN): 2 ML/MIN/1.73 M^2
GLUCOSE SERPL-MCNC: 127 MG/DL
MAGNESIUM SERPL-MCNC: 2.7 MG/DL
POTASSIUM SERPL-SCNC: 5.3 MMOL/L
PROT SERPL-MCNC: 7.9 G/DL
SODIUM SERPL-SCNC: 142 MMOL/L

## 2017-07-25 PROCEDURE — 99214 OFFICE O/P EST MOD 30 MIN: CPT | Mod: S$GLB,,, | Performed by: FAMILY MEDICINE

## 2017-07-25 PROCEDURE — 99999 PR PBB SHADOW E&M-EST. PATIENT-LVL III: CPT | Mod: PBBFAC,,, | Performed by: FAMILY MEDICINE

## 2017-07-25 PROCEDURE — 36415 COLL VENOUS BLD VENIPUNCTURE: CPT | Mod: PO

## 2017-07-25 PROCEDURE — 80053 COMPREHEN METABOLIC PANEL: CPT | Mod: PO

## 2017-07-25 PROCEDURE — 85379 FIBRIN DEGRADATION QUANT: CPT | Mod: PO

## 2017-07-25 PROCEDURE — 83735 ASSAY OF MAGNESIUM: CPT | Mod: PO

## 2017-07-25 RX ORDER — FUROSEMIDE 80 MG/1
80 TABLET ORAL EVERY MORNING
Refills: 6 | COMMUNITY
Start: 2017-07-20 | End: 2018-07-31 | Stop reason: SDUPTHER

## 2017-07-25 RX ORDER — GABAPENTIN 300 MG/1
300 CAPSULE ORAL 2 TIMES DAILY
Refills: 0 | COMMUNITY
Start: 2017-06-12 | End: 2017-12-18

## 2017-07-25 RX ORDER — TRAMADOL HYDROCHLORIDE 50 MG/1
50 TABLET ORAL EVERY 4 HOURS PRN
Qty: 30 TABLET | Refills: 0 | Status: SHIPPED | OUTPATIENT
Start: 2017-07-25 | End: 2017-08-04

## 2017-07-25 NOTE — TELEPHONE ENCOUNTER
Pt wife called stating pt leg is hurting and would like something sent to pharmacy for leg pain. Please advise

## 2017-07-25 NOTE — TELEPHONE ENCOUNTER
Pt wife notified, results given u/s appt scheduled and informed Rx for pain med sent to pharmacy as per Dr. Ta. Wife voiced understanding.

## 2017-07-25 NOTE — TELEPHONE ENCOUNTER
----- Message from Royer Ta DO sent at 7/25/2017  1:04 PM CDT -----  Due to elevated D-dimer and leg pain we need to have him come in for ultrasound of leg. This has been ordered. He can come today.

## 2017-07-25 NOTE — TELEPHONE ENCOUNTER
----- Message from Susan Sanchez sent at 7/25/2017 12:11 PM CDT -----  Contact: Patients wife, Radha Garcia states that her husbands leg ins hurting real bad and was not given anything for pain on his visit. Ms Garcia is requesting something for pain be called in, please call  Her back at [phone]. Thank you

## 2017-07-25 NOTE — PROGRESS NOTES
Subjective:       Patient ID: Rom Miner Jr. is a 57 y.o. male.    Chief Complaint: Leg Pain    HPI  here today continuing to have leg pain that fluctuates but has worsened over the last week and a half. He has had this problem for quite some time and has tried to manage this by taking for his wife's muscle relaxer.  He has not been able to get compression stockings as recommended at the last visit.  Has continued to take medication to manage peripheral vascular disease and has been compliant with his peritoneal dialysis.  Is pain did keep him up last night and is worse to palpation.  He feels quite uncomfortable right now    Alexandrea is nurse with nephrology    Family History   Problem Relation Age of Onset    No Known Problems Mother     Cirrhosis Father        Current Outpatient Prescriptions:     amlodipine (NORVASC) 10 MG tablet, Take 10 mg by mouth once daily., Disp: , Rfl:     atorvastatin (LIPITOR) 20 MG tablet, Take 1 tablet (20 mg total) by mouth once daily., Disp: 90 tablet, Rfl: 3    AURYXIA 210 mg iron Tab, TAKE 2 TABLETS BY MOUTH THREE TIMES A DAY WITH MEALS AND TAKE 1 TABLET BY MOUTH TWO TIMES A DAY WITH SNACKS, Disp: , Rfl: 11    B&C/FERROUS FUM/FA/D3/ZINC OX (PRORENAL ORAL), Take by mouth., Disp: , Rfl:     BYSTOLIC 10 mg Tab, Take 10 mg by mouth once daily., Disp: , Rfl: 3    calcitRIOL (ROCALTROL) 0.5 MCG Cap, Take 0.5 mcg by mouth once daily., Disp: , Rfl:     furosemide (LASIX) 80 MG tablet, Take 80 mg by mouth every morning., Disp: , Rfl: 6    gabapentin (NEURONTIN) 300 MG capsule, Take 300 mg by mouth 2 (two) times daily., Disp: , Rfl: 0    gentamicin (GARAMYCIN) 0.1 % cream, APPLY TO EXIT SITE ONCE DAILY, Disp: , Rfl: 2    hydrALAZINE (APRESOLINE) 25 MG tablet, Take 25 mg by mouth 3 (three) times daily., Disp: , Rfl: 3    hydrocodone-acetaminophen 10-325mg (NORCO)  mg Tab, Take 1 tablet by mouth every 6 (six) hours as needed., Disp: 20 tablet, Rfl: 0    ibuprofen  "(ADVIL,MOTRIN) 600 MG tablet, Take 1 tablet (600 mg total) by mouth every 8 (eight) hours as needed for Pain., Disp: 30 tablet, Rfl: 0    levocetirizine (XYZAL) 5 MG tablet, TAKE ONE TABLET BY MOUTH ONCE DAILY AS NEEDED FOR ALLERGIES, Disp: 30 tablet, Rfl: 5    losartan (COZAAR) 100 MG tablet, Take 100 mg by mouth once daily., Disp: , Rfl:     magnesium oxide (MAG-OX) 400 mg tablet, Take 400 mg by mouth once daily., Disp: , Rfl:     mirtazapine (REMERON) 15 MG tablet, Take 15 mg by mouth every evening., Disp: , Rfl:     nicotine (NICODERM CQ) 14 mg/24 hr, Place 1 patch onto the skin once daily., Disp: 30 patch, Rfl: 5    spironolactone (ALDACTONE) 50 MG tablet, Take 50 mg by mouth., Disp: , Rfl:     Review of Systems   Constitutional: Negative for chills and fever.   Eyes: Negative for visual disturbance.   Respiratory: Negative for cough and shortness of breath.    Cardiovascular: Positive for leg swelling. Negative for chest pain.   Gastrointestinal: Negative for abdominal pain.   Musculoskeletal: Positive for gait problem and myalgias.   Neurological: Negative for dizziness.       Objective:   BP (!) 142/84 (BP Location: Left arm, Patient Position: Sitting, BP Method: Automatic)   Pulse 71   Temp 96.2 °F (35.7 °C) (Tympanic)   Resp 18   Ht 6' 1" (1.854 m)   Wt 95.6 kg (210 lb 12.2 oz)   SpO2 96%   BMI 27.81 kg/m²      Physical Exam   Constitutional: He is oriented to person, place, and time. He appears well-developed and well-nourished.   HENT:   Head: Normocephalic and atraumatic.   Eyes: Conjunctivae are normal.   Cardiovascular: Normal rate.    Pulmonary/Chest: Effort normal. No respiratory distress.   Musculoskeletal: He exhibits no edema.   No marked edema nor erythema to his legs.  He does have tenderness to palpation of his left calf.  Has a somewhat antalgic gait.   Neurological: He is alert and oriented to person, place, and time. Coordination normal.   Skin: Skin is warm and dry. No rash " noted.   Psychiatric: He has a normal mood and affect. His behavior is normal.   Vitals reviewed.      Assessment & Plan     1. Leg cramps  I'm concerned that this is secondary to his renal disease and electrolyte abnormality.  I have requested records from his nephrologist and also we will repeat laboratory studies today.  Due to the acute onset/worsening of the symptoms I'm getting a d-dimer to help delineate whether it is a blood clot because he is quite uncomfortable and tender to palpation in the left calf.  - Magnesium; Future  - Comprehensive metabolic panel; Future  - D dimer, quantitative; Future    2. Stage 5 chronic kidney disease on chronic dialysis  Requested records from nephrology.    3. Chronic hepatitis C without hepatic coma  The patient he has been cured of hepatitis C after completing treatment    4. PVD (peripheral vascular disease)  Known to have peripheral vascular disease and the current recommendation is for medical therapy

## 2017-09-05 ENCOUNTER — HOSPITAL ENCOUNTER (OUTPATIENT)
Dept: RADIOLOGY | Facility: HOSPITAL | Age: 57
Discharge: HOME | End: 2017-09-05
Attending: FAMILY MEDICINE
Payer: COMMERCIAL

## 2017-09-05 DIAGNOSIS — R79.89 D-DIMER, ELEVATED: ICD-10-CM

## 2017-09-05 DIAGNOSIS — M79.662 PAIN OF LEFT CALF: ICD-10-CM

## 2017-09-05 PROCEDURE — 93971 EXTREMITY STUDY: CPT | Mod: TC,PO

## 2017-09-05 PROCEDURE — 93971 EXTREMITY STUDY: CPT | Mod: 26,,, | Performed by: RADIOLOGY

## 2017-12-15 ENCOUNTER — TELEPHONE (OUTPATIENT)
Dept: INTERNAL MEDICINE | Facility: CLINIC | Age: 57
End: 2017-12-15

## 2017-12-15 ENCOUNTER — HOSPITAL ENCOUNTER (EMERGENCY)
Facility: HOSPITAL | Age: 57
Discharge: HOME | End: 2017-12-15
Attending: EMERGENCY MEDICINE
Payer: COMMERCIAL

## 2017-12-15 VITALS
WEIGHT: 212.63 LBS | DIASTOLIC BLOOD PRESSURE: 80 MMHG | TEMPERATURE: 98 F | RESPIRATION RATE: 20 BRPM | HEART RATE: 74 BPM | SYSTOLIC BLOOD PRESSURE: 142 MMHG | HEIGHT: 73 IN | OXYGEN SATURATION: 97 % | BODY MASS INDEX: 28.18 KG/M2

## 2017-12-15 DIAGNOSIS — R05.9 COUGH: ICD-10-CM

## 2017-12-15 DIAGNOSIS — J18.9 PNEUMONIA OF RIGHT LUNG DUE TO INFECTIOUS ORGANISM, UNSPECIFIED PART OF LUNG: Primary | ICD-10-CM

## 2017-12-15 PROCEDURE — 99284 EMERGENCY DEPT VISIT MOD MDM: CPT

## 2017-12-15 RX ORDER — LEVOFLOXACIN 500 MG/1
750 TABLET, FILM COATED ORAL DAILY
Qty: 5 TABLET | Refills: 0 | Status: SHIPPED | OUTPATIENT
Start: 2017-12-15 | End: 2017-12-15 | Stop reason: ALTCHOICE

## 2017-12-15 RX ORDER — AZITHROMYCIN 250 MG/1
250 TABLET, FILM COATED ORAL DAILY
Qty: 6 TABLET | Refills: 0 | Status: SHIPPED | OUTPATIENT
Start: 2017-12-15 | End: 2018-01-18 | Stop reason: ALTCHOICE

## 2017-12-15 RX ORDER — HYDROCODONE POLISTIREX AND CHLORPHENIRAMINE POLISTIREX 10; 8 MG/5ML; MG/5ML
2.5 SUSPENSION, EXTENDED RELEASE ORAL EVERY 12 HOURS PRN
Qty: 35 ML | Refills: 0 | Status: SHIPPED | OUTPATIENT
Start: 2017-12-15 | End: 2017-12-22

## 2017-12-15 NOTE — TELEPHONE ENCOUNTER
----- Message from Jennie Astudillo sent at 12/15/2017 12:30 PM CST -----  Contact: pt wife  She would like for the pt's medication to go to this pharmacy only. She stated that he does not use the [facility]    [facility] [address]  [address]  [place] [address]  Phone: [phone] Fax: [phone]    She can be reached at .[phone] (home)

## 2017-12-15 NOTE — ED NOTES
LOC: The patient is awake, alert and aware of environment with an appropriate affect, the patient is oriented x 3 and speaking appropriately.  APPEARANCE: Patient resting comfortably and in no acute distress, patient is clean and well groomed, patient's clothing is properly fastened.  HEENT: Brief WNL  SKIN: Brief WNL.   MUSCULOSKELETAL: Brief WNL. Except generalized body aches.   RESPIRATORY: Brief WNL. Except loose productive cough with green sputum  CARDIAC: Brief WNL  GASTRO: Brief WNL  : Brief WNL  Peripheral Vasc: Brief WNL  NEURO: Brief WNL  PSYCH: Brief WNL

## 2017-12-18 ENCOUNTER — OFFICE VISIT (OUTPATIENT)
Dept: INTERNAL MEDICINE | Facility: CLINIC | Age: 57
End: 2017-12-18
Payer: COMMERCIAL

## 2017-12-18 VITALS
HEIGHT: 73 IN | DIASTOLIC BLOOD PRESSURE: 80 MMHG | HEART RATE: 75 BPM | OXYGEN SATURATION: 96 % | WEIGHT: 206.56 LBS | BODY MASS INDEX: 27.37 KG/M2 | RESPIRATION RATE: 18 BRPM | TEMPERATURE: 96 F | SYSTOLIC BLOOD PRESSURE: 130 MMHG

## 2017-12-18 DIAGNOSIS — I12.0 HYPERTENSION DUE TO END STAGE RENAL DISEASE ON DIALYSIS: Primary | ICD-10-CM

## 2017-12-18 DIAGNOSIS — Z99.2 HYPERTENSION DUE TO END STAGE RENAL DISEASE ON DIALYSIS: Primary | ICD-10-CM

## 2017-12-18 DIAGNOSIS — J18.9 COMMUNITY ACQUIRED PNEUMONIA, UNSPECIFIED LATERALITY: ICD-10-CM

## 2017-12-18 DIAGNOSIS — N18.6 HYPERTENSION DUE TO END STAGE RENAL DISEASE ON DIALYSIS: Primary | ICD-10-CM

## 2017-12-18 PROCEDURE — 99214 OFFICE O/P EST MOD 30 MIN: CPT | Mod: S$GLB,,, | Performed by: FAMILY MEDICINE

## 2017-12-18 PROCEDURE — 99999 PR PBB SHADOW E&M-EST. PATIENT-LVL III: CPT | Mod: PBBFAC,,, | Performed by: FAMILY MEDICINE

## 2017-12-18 RX ORDER — LEVOCETIRIZINE DIHYDROCHLORIDE 5 MG/1
5 TABLET, FILM COATED ORAL NIGHTLY
COMMUNITY

## 2017-12-18 RX ORDER — ERGOCALCIFEROL 1.25 MG/1
50000 CAPSULE ORAL
COMMUNITY
End: 2018-10-03

## 2017-12-20 NOTE — ED PROVIDER NOTES
Encounter Date: 12/15/2017       History     Chief Complaint   Patient presents with    Generalized Body Aches     x 2-3 days ago, denies fever, productive cough with green sputum,      Patient currently presents with a chief complaint of cough.  Onset was noted 3 days ago.  There is little associated rhinorrhea and congestion.  Fever and chills are denied.  Vomiting and diarrhea are denied.  Over-the-counter remedies have not been attempted.  There has not been purulent nasal discharge. Cough has been productive of green sputum.  Denies SOB.            Review of patient's allergies indicates:  No Known Allergies  Past Medical History:   Diagnosis Date    Hypertension     Peritoneal dialysis status     Renal disorder      Past Surgical History:   Procedure Laterality Date    AV FISTULA PLACEMENT      CT guided Liver Biopsy  03/20/2017    [facility] stain: Positive for significant fibrosis. PAS w/database: Negative for PAS-D resistant intracellular globules. Prussian blue stain for iron: Positive for 2-3+ stainable iron deposition. Fibrous portal expansion: Present. Bridging fibrosis: Present     Family History   Problem Relation Age of Onset    No Known Problems Mother     Cirrhosis Father      Social History   Substance Use Topics    Smoking status: Former Smoker     Packs/day: 0.50    Smokeless tobacco: Never Used    Alcohol use No     Review of Systems   Constitutional: Negative for chills and fever.   HENT: Negative for postnasal drip.    Respiratory: Positive for cough. Negative for chest tightness and shortness of breath.    Cardiovascular: Negative for chest pain and leg swelling.   Gastrointestinal: Negative for abdominal pain, constipation, diarrhea, nausea and vomiting.   Genitourinary: Negative for dysuria, frequency and urgency.   Skin: Negative for color change and rash.   Allergic/Immunologic: Negative for immunocompromised state.   Neurological: Negative for weakness and  numbness.   Hematological: Negative for adenopathy. Does not bruise/bleed easily.   All other systems reviewed and are negative.      Physical Exam     Initial Vitals [12/15/17 0926]   BP Pulse Resp Temp SpO2   (!) 142/80 74 20 98.3 °F (36.8 °C) 97 %      MAP       100.67         Physical Exam    Nursing note and vitals reviewed.  Constitutional: He appears well-developed and well-nourished. He is not diaphoretic. No distress.   HENT:   Head: Normocephalic and atraumatic.   Right Ear: External ear normal.   Left Ear: External ear normal.   Nose: Nose normal.   Mouth/Throat: Oropharynx is clear and moist.   Eyes: Conjunctivae and EOM are normal. Pupils are equal, round, and reactive to light. No scleral icterus.   Neck: Neck supple. No JVD present.   Cardiovascular: Normal rate, regular rhythm, normal heart sounds and intact distal pulses. Exam reveals no gallop and no friction rub.    No murmur heard.  Pulmonary/Chest: No respiratory distress. He has wheezes. He has no rhonchi. He has no rales.   Decreased BS RIGHT mid and lower lung fields with occasional wheeze   Abdominal: Soft. Bowel sounds are normal. He exhibits no distension. There is no tenderness.   Musculoskeletal: Normal range of motion. He exhibits no edema.   Neurological: He is alert and oriented to person, place, and time.   Skin: Skin is warm and dry. No rash noted.   Psychiatric: He has a normal mood and affect. His behavior is normal.         ED Course   Procedures  Labs Reviewed - No data to display     Imaging Results          X-Ray Chest PA And Lateral (Final result)     Abnormal  Result time 12/15/17 10:15:38    Final result by León Gutierres MD (12/15/17 10:15:38)                 Impression:       Cardiomegaly.Mild pulmonary vascular congestion. Mild infiltrate is seen within the right midlung zone which could reflect airspace disease or edema.        Electronically signed by: LEÓN GUTIERRES MD  Date:     12/15/17  Time:    10:15               Narrative:    Clinical Data:SOB    Comparison:  none    Findings:  Single view of the chest.      Cardiac silhouette is enlarged. Aorta demonstrates atherosclerotic disease. Mild pulmonary vascular congestion. Mild infiltrate is seen within the right midlung zone which could reflect airspace disease or edema.  The lungs demonstrate no evidence of active disease.  No evidence of pleural effusion or pneumothorax.  Bones appear intact.                                 Medical Decision Making:   ED Management:  All findings were reviewed with the patient/family in detail along with the diagnosis of RIGHT-sided pneumonia.  At present he appears to be doing very well overall despite his diagnosis.  I see no indication of an emergent process beyond that addressed during our encounter but have duly counseled the patient/family regarding the need for prompt follow-up as well as the indications that should prompt immediate return to the emergency room should new or worrisome developments occur.  The patient/family communicates understanding of all this information and all remaining questions and concerns were addressed at this time.                       ED Course      Clinical Impression:   The primary encounter diagnosis was Pneumonia of right lung due to infectious organism, unspecified part of lung. A diagnosis of Cough was also pertinent to this visit.                           Raymond Bonner MD  12/20/17 0613

## 2017-12-26 ENCOUNTER — PATIENT OUTREACH (OUTPATIENT)
Dept: ADMINISTRATIVE | Facility: HOSPITAL | Age: 57
End: 2017-12-26

## 2018-01-18 ENCOUNTER — OFFICE VISIT (OUTPATIENT)
Dept: INTERNAL MEDICINE | Facility: CLINIC | Age: 58
End: 2018-01-18
Payer: COMMERCIAL

## 2018-01-18 ENCOUNTER — PATIENT OUTREACH (OUTPATIENT)
Dept: ADMINISTRATIVE | Facility: HOSPITAL | Age: 58
End: 2018-01-18

## 2018-01-18 ENCOUNTER — HOSPITAL ENCOUNTER (EMERGENCY)
Facility: HOSPITAL | Age: 58
End: 2018-01-18
Attending: EMERGENCY MEDICINE
Payer: COMMERCIAL

## 2018-01-18 VITALS
HEIGHT: 73 IN | DIASTOLIC BLOOD PRESSURE: 90 MMHG | RESPIRATION RATE: 22 BRPM | BODY MASS INDEX: 28.96 KG/M2 | HEART RATE: 124 BPM | SYSTOLIC BLOOD PRESSURE: 143 MMHG | OXYGEN SATURATION: 93 % | TEMPERATURE: 98 F | WEIGHT: 218.5 LBS

## 2018-01-18 VITALS
RESPIRATION RATE: 27 BRPM | HEIGHT: 73 IN | OXYGEN SATURATION: 94 % | BODY MASS INDEX: 28.76 KG/M2 | WEIGHT: 217 LBS | DIASTOLIC BLOOD PRESSURE: 109 MMHG | HEART RATE: 131 BPM | TEMPERATURE: 98 F | SYSTOLIC BLOOD PRESSURE: 174 MMHG

## 2018-01-18 DIAGNOSIS — Z99.2 ESRD ON PERITONEAL DIALYSIS: ICD-10-CM

## 2018-01-18 DIAGNOSIS — R06.02 SOB (SHORTNESS OF BREATH): Primary | ICD-10-CM

## 2018-01-18 DIAGNOSIS — I50.9 ACUTE CONGESTIVE HEART FAILURE, UNSPECIFIED CONGESTIVE HEART FAILURE TYPE: ICD-10-CM

## 2018-01-18 DIAGNOSIS — R09.02 HYPOXEMIA: ICD-10-CM

## 2018-01-18 DIAGNOSIS — R06.02 SOB (SHORTNESS OF BREATH): ICD-10-CM

## 2018-01-18 DIAGNOSIS — I48.91 ATRIAL FIBRILLATION WITH RVR: Primary | ICD-10-CM

## 2018-01-18 DIAGNOSIS — N18.6 ESRD ON PERITONEAL DIALYSIS: ICD-10-CM

## 2018-01-18 LAB
ALBUMIN SERPL BCP-MCNC: 2.6 G/DL
ALLENS TEST: ABNORMAL
ALP SERPL-CCNC: 34 U/L
ALT SERPL W/O P-5'-P-CCNC: 29 U/L
ANION GAP SERPL CALC-SCNC: 17 MMOL/L
APTT BLDCRRT: 31.6 SEC
AST SERPL-CCNC: 40 U/L
BASOPHILS # BLD AUTO: 0.02 K/UL
BASOPHILS NFR BLD: 0.2 %
BILIRUB SERPL-MCNC: 0.4 MG/DL
BNP SERPL-MCNC: 3324 PG/ML
BUN SERPL-MCNC: 102 MG/DL
CALCIUM SERPL-MCNC: 8.6 MG/DL
CHLORIDE SERPL-SCNC: 101 MMOL/L
CO2 SERPL-SCNC: 24 MMOL/L
CREAT SERPL-MCNC: 16.3 MG/DL
DELSYS: ABNORMAL
DIFFERENTIAL METHOD: ABNORMAL
EOSINOPHIL # BLD AUTO: 0.1 K/UL
EOSINOPHIL NFR BLD: 0.9 %
ERYTHROCYTE [DISTWIDTH] IN BLOOD BY AUTOMATED COUNT: 17.4 %
EST. GFR  (AFRICAN AMERICAN): 3.2 ML/MIN/1.73 M^2
EST. GFR  (NON AFRICAN AMERICAN): 2.8 ML/MIN/1.73 M^2
FIO2: 21
FLUAV AG SPEC QL IA: NEGATIVE
FLUBV AG SPEC QL IA: NEGATIVE
GLUCOSE SERPL-MCNC: 90 MG/DL
HCO3 UR-SCNC: 26.1 MMOL/L (ref 24–28)
HCT VFR BLD AUTO: 28.6 %
HGB BLD-MCNC: 9.1 G/DL
INR PPP: 1.2
LACTATE SERPL-SCNC: 0.6 MMOL/L
LIPASE SERPL-CCNC: 48 U/L
LYMPHOCYTES # BLD AUTO: 2.3 K/UL
LYMPHOCYTES NFR BLD: 22.6 %
MAGNESIUM SERPL-MCNC: 2.1 MG/DL
MCH RBC QN AUTO: 29.1 PG
MCHC RBC AUTO-ENTMCNC: 31.8 G/DL
MCV RBC AUTO: 91 FL
MODE: ABNORMAL
MONOCYTES # BLD AUTO: 1.8 K/UL
MONOCYTES NFR BLD: 17.6 %
NEUTROPHILS # BLD AUTO: 5.8 K/UL
NEUTROPHILS NFR BLD: 57.7 %
PCO2 BLDA: 32.2 MMHG (ref 35–45)
PH SMN: 7.52 [PH] (ref 7.35–7.45)
PHOSPHATE SERPL-MCNC: 7.2 MG/DL
PLATELET # BLD AUTO: 148 K/UL
PMV BLD AUTO: 10.7 FL
PO2 BLDA: 57 MMHG (ref 80–100)
POC BE: 3 MMOL/L
POC SATURATED O2: 92 % (ref 95–100)
POTASSIUM SERPL-SCNC: 4.2 MMOL/L
PROT SERPL-MCNC: 6.6 G/DL
PROTHROMBIN TIME: 12 SEC
RBC # BLD AUTO: 3.13 M/UL
SAMPLE: ABNORMAL
SITE: ABNORMAL
SODIUM SERPL-SCNC: 142 MMOL/L
SPECIMEN SOURCE: NORMAL
TROPONIN I SERPL DL<=0.01 NG/ML-MCNC: 0.08 NG/ML
TSH SERPL DL<=0.005 MIU/L-ACNC: 0.63 UIU/ML
WBC # BLD AUTO: 10.11 K/UL

## 2018-01-18 PROCEDURE — 85730 THROMBOPLASTIN TIME PARTIAL: CPT

## 2018-01-18 PROCEDURE — 36600 WITHDRAWAL OF ARTERIAL BLOOD: CPT

## 2018-01-18 PROCEDURE — 25000242 PHARM REV CODE 250 ALT 637 W/ HCPCS: Performed by: EMERGENCY MEDICINE

## 2018-01-18 PROCEDURE — 93010 ELECTROCARDIOGRAM REPORT: CPT | Mod: ,,, | Performed by: INTERNAL MEDICINE

## 2018-01-18 PROCEDURE — 25000003 PHARM REV CODE 250: Performed by: EMERGENCY MEDICINE

## 2018-01-18 PROCEDURE — 82533 TOTAL CORTISOL: CPT

## 2018-01-18 PROCEDURE — 99900035 HC TECH TIME PER 15 MIN (STAT)

## 2018-01-18 PROCEDURE — 27000221 HC OXYGEN, UP TO 24 HOURS

## 2018-01-18 PROCEDURE — 87040 BLOOD CULTURE FOR BACTERIA: CPT

## 2018-01-18 PROCEDURE — 85610 PROTHROMBIN TIME: CPT

## 2018-01-18 PROCEDURE — 99999 PR PBB SHADOW E&M-EST. PATIENT-LVL III: CPT | Mod: PBBFAC,,, | Performed by: FAMILY MEDICINE

## 2018-01-18 PROCEDURE — 84100 ASSAY OF PHOSPHORUS: CPT

## 2018-01-18 PROCEDURE — 63600175 PHARM REV CODE 636 W HCPCS: Performed by: EMERGENCY MEDICINE

## 2018-01-18 PROCEDURE — 83605 ASSAY OF LACTIC ACID: CPT

## 2018-01-18 PROCEDURE — 94640 AIRWAY INHALATION TREATMENT: CPT

## 2018-01-18 PROCEDURE — 82803 BLOOD GASES ANY COMBINATION: CPT

## 2018-01-18 PROCEDURE — 96375 TX/PRO/DX INJ NEW DRUG ADDON: CPT

## 2018-01-18 PROCEDURE — 93005 ELECTROCARDIOGRAM TRACING: CPT

## 2018-01-18 PROCEDURE — 84443 ASSAY THYROID STIM HORMONE: CPT

## 2018-01-18 PROCEDURE — 99285 EMERGENCY DEPT VISIT HI MDM: CPT | Mod: 25

## 2018-01-18 PROCEDURE — 83690 ASSAY OF LIPASE: CPT

## 2018-01-18 PROCEDURE — 85025 COMPLETE CBC W/AUTO DIFF WBC: CPT

## 2018-01-18 PROCEDURE — 87400 INFLUENZA A/B EACH AG IA: CPT | Mod: 59

## 2018-01-18 PROCEDURE — 96374 THER/PROPH/DIAG INJ IV PUSH: CPT

## 2018-01-18 PROCEDURE — 84484 ASSAY OF TROPONIN QUANT: CPT

## 2018-01-18 PROCEDURE — 99213 OFFICE O/P EST LOW 20 MIN: CPT | Mod: S$GLB,,, | Performed by: FAMILY MEDICINE

## 2018-01-18 PROCEDURE — 83735 ASSAY OF MAGNESIUM: CPT

## 2018-01-18 PROCEDURE — 80053 COMPREHEN METABOLIC PANEL: CPT

## 2018-01-18 PROCEDURE — 84145 PROCALCITONIN (PCT): CPT

## 2018-01-18 PROCEDURE — 83880 ASSAY OF NATRIURETIC PEPTIDE: CPT

## 2018-01-18 RX ORDER — IPRATROPIUM BROMIDE AND ALBUTEROL SULFATE 2.5; .5 MG/3ML; MG/3ML
3 SOLUTION RESPIRATORY (INHALATION)
Status: COMPLETED | OUTPATIENT
Start: 2018-01-18 | End: 2018-01-18

## 2018-01-18 RX ORDER — DILTIAZEM HCL/D5W 125 MG/125
10 PLASTIC BAG, INJECTION (ML) INTRAVENOUS CONTINUOUS
Status: DISCONTINUED | OUTPATIENT
Start: 2018-01-18 | End: 2018-01-19 | Stop reason: HOSPADM

## 2018-01-18 RX ORDER — FUROSEMIDE 10 MG/ML
40 INJECTION INTRAMUSCULAR; INTRAVENOUS
Status: COMPLETED | OUTPATIENT
Start: 2018-01-18 | End: 2018-01-18

## 2018-01-18 RX ORDER — DILTIAZEM HYDROCHLORIDE 5 MG/ML
10 INJECTION INTRAVENOUS
Status: COMPLETED | OUTPATIENT
Start: 2018-01-18 | End: 2018-01-18

## 2018-01-18 RX ADMIN — FUROSEMIDE 40 MG: 10 INJECTION, SOLUTION INTRAMUSCULAR; INTRAVENOUS at 09:01

## 2018-01-18 RX ADMIN — IPRATROPIUM BROMIDE AND ALBUTEROL SULFATE 3 ML: .5; 3 SOLUTION RESPIRATORY (INHALATION) at 09:01

## 2018-01-18 RX ADMIN — DILTIAZEM HYDROCHLORIDE 10 MG: 5 INJECTION INTRAVENOUS at 07:01

## 2018-01-18 RX ADMIN — IPRATROPIUM BROMIDE AND ALBUTEROL SULFATE 3 ML: .5; 3 SOLUTION RESPIRATORY (INHALATION) at 07:01

## 2018-01-18 RX ADMIN — Medication 5 MG/HR: at 10:01

## 2018-01-19 LAB
CORTIS SERPL-MCNC: 7.2 UG/DL
PROCALCITONIN SERPL IA-MCNC: 2.44 NG/ML

## 2018-01-19 NOTE — ED NOTES
Patient verbally verified and Spelled Full Name and Date of Birth.  Was sent from PMD with SOB, productive cough & body aches x past 3-4 days.  LOC: The patient is awake, alert and aware of environment with an appropriate affect, the patient is oriented x 3 and speaking appropriately.  O2 sat rm air 93%, c/o pain to upper abd & lower chest & legs.  Hx of renal failure & peritoneal dialysis x past 3yrs & hasnt skipped.   Denies fever, N,V or D.  APPEARANCE:  patient is clean and well groomed, patient's clothing is properly fastened.  HEENT: Brief WNL  SKIN: Brief WNL.   MUSCULOSKELETAL: Brief WNL except for c/o thomas leg pain  RESPIRATORY: positive for prod cough of blood streaked sputum, wheezes & SOB  CARDIAC: tachycardic at 133/min., deneis chest pain except for with coughing episodes  GASTRO: Brief WNL  : occasionally makes urine per pt  Peripheral Vasc: Brief WNL  NEURO: Brief WNL  PSYCH: Brief WNL

## 2018-01-19 NOTE — ED PROVIDER NOTES
Encounter Date: 1/18/2018       History     Chief Complaint   Patient presents with    Shortness of Breath     sent from PCP     The history is provided by the patient.   Shortness of Breath   This is a new problem. The average episode lasts 3 days. The problem occurs intermittently.The current episode started more than 2 days ago. The problem has been gradually worsening. Associated symptoms include sore throat, cough, sputum production and wheezing. Pertinent negatives include no fever, no headaches, no coryza, no rhinorrhea, no swollen glands, no ear pain, no neck pain, no hemoptysis, no PND, no orthopnea, no chest pain, no syncope, no vomiting, no abdominal pain, no rash, no leg pain, no leg swelling and no claudication. The problem's precipitants include weather changes. Treatments tried: OTC cough medicine. The treatment provided mild relief. He has had no prior hospitalizations. He has had prior ED visits (for pneumonia last month). Associated medical issues include pneumonia. Associated medical issues do not include asthma, COPD, chronic lung disease, PE, CAD, heart failure, past MI, DVT or recent surgery. Associated medical issues comments: esrd on peritoneal dialysis-has not skipped any dialysis sessions.     Review of patient's allergies indicates:  No Known Allergies  Past Medical History:   Diagnosis Date    Hypertension     Peritoneal dialysis status     Renal disorder      Past Surgical History:   Procedure Laterality Date    AV FISTULA PLACEMENT      CT guided Liver Biopsy  03/20/2017    [facility] [employer]-Trichrome stain: Positive for significant fibrosis. PAS w/database: Negative for PAS-D resistant intracellular globules. Prussian blue stain for iron: Positive for 2-3+ stainable iron deposition. Fibrous portal expansion: Present. Bridging fibrosis: Present     Family History   Problem Relation Age of Onset    No Known Problems Mother     Cirrhosis Father      Social History   Substance  Use Topics    Smoking status: Former Smoker     Packs/day: 0.50    Smokeless tobacco: Never Used    Alcohol use No     Review of Systems   Constitutional: Negative for fever.   HENT: Positive for sore throat. Negative for ear pain and rhinorrhea.    Respiratory: Positive for cough, sputum production, shortness of breath and wheezing. Negative for hemoptysis.    Cardiovascular: Negative for chest pain, orthopnea, claudication, leg swelling, syncope and PND.   Gastrointestinal: Negative for abdominal pain, nausea and vomiting.   Genitourinary: Negative for dysuria.   Musculoskeletal: Negative for back pain and neck pain.   Skin: Negative for rash.   Neurological: Negative for weakness and headaches.   Hematological: Does not bruise/bleed easily.   All other systems reviewed and are negative.      Physical Exam     Initial Vitals [01/18/18 1802]   BP Pulse Resp Temp SpO2   (!) 154/96 (!) 137 20 98.5 °F (36.9 °C) (!) 91 %      MAP       115.33         Physical Exam    Nursing note and vitals reviewed.  Constitutional: He appears well-developed and well-nourished. He is not diaphoretic. No distress.   HENT:   Head: Normocephalic and atraumatic.   Right Ear: Hearing, tympanic membrane, external ear and ear canal normal.   Left Ear: Hearing, tympanic membrane, external ear and ear canal normal.   Nose: Mucosal edema present. Right sinus exhibits no maxillary sinus tenderness and no frontal sinus tenderness. Left sinus exhibits no maxillary sinus tenderness and no frontal sinus tenderness.   Mouth/Throat: Uvula is midline, oropharynx is clear and moist and mucous membranes are normal.   Eyes: EOM are normal. Pupils are equal, round, and reactive to light. No scleral icterus.   Neck: Normal range of motion. Neck supple. No thyromegaly present.   Cardiovascular: Normal heart sounds and intact distal pulses. An irregularly irregular rhythm present. Tachycardia present.  Exam reveals no gallop and no friction rub.    No  murmur heard.  1+ edema bilateral ankles   Pulmonary/Chest: No accessory muscle usage. No respiratory distress. He has wheezes (end exp wheezing) in the right middle field, the right lower field, the left middle field and the left lower field. He has rhonchi in the right upper field, the right middle field, the right lower field, the left middle field and the left lower field. He exhibits no tenderness.   Abdominal: Soft. Bowel sounds are normal. He exhibits no distension. There is no tenderness. There is no rebound and no guarding.   Musculoskeletal: Normal range of motion. He exhibits no edema or tenderness.   Lymphadenopathy:     He has no cervical adenopathy.   Neurological: He is alert and oriented to person, place, and time. He has normal strength. No cranial nerve deficit or sensory deficit. GCS eye subscore is 4. GCS verbal subscore is 5. GCS motor subscore is 6.   Skin: Skin is warm and dry.   Psychiatric: He has a normal mood and affect. His behavior is normal. Judgment and thought content normal.         ED Course   Procedures  Labs Reviewed   B-TYPE NATRIURETIC PEPTIDE - Abnormal; Notable for the following:        Result Value    BNP 3,324 (*)     All other components within normal limits   CBC W/ AUTO DIFFERENTIAL - Abnormal; Notable for the following:     RBC 3.13 (*)     Hemoglobin 9.1 (*)     Hematocrit 28.6 (*)     MCHC 31.8 (*)     RDW 17.4 (*)     Platelets 148 (*)     Mono # 1.8 (*)     Mono% 17.6 (*)     All other components within normal limits   COMPREHENSIVE METABOLIC PANEL - Abnormal; Notable for the following:     BUN, Bld 102 (*)     Creatinine 16.3 (*)     Calcium 8.6 (*)     Albumin 2.6 (*)     Alkaline Phosphatase 34 (*)     Anion Gap 17 (*)     eGFR if  3.2 (*)     eGFR if non  2.8 (*)     All other components within normal limits   PHOSPHORUS - Abnormal; Notable for the following:     Phosphorus 7.2 (*)     All other components within normal limits    TROPONIN I - Abnormal; Notable for the following:     Troponin I 0.083 (*)     All other components within normal limits   ISTAT PROCEDURE - Abnormal; Notable for the following:     POC PH 7.517 (*)     POC PCO2 32.2 (*)     POC PO2 57 (*)     POC SATURATED O2 92 (*)     All other components within normal limits   CULTURE, BLOOD   CULTURE, BLOOD   APTT   LACTIC ACID, PLASMA   LIPASE   MAGNESIUM   PROTIME-INR   TSH   INFLUENZA A AND B ANTIGEN   PHOSPHORUS   CORTISOL, RANDOM   PROCALCITONIN     EKG Readings: (Independently Interpreted)   Initial Reading: No STEMI. Rhythm: Atrial Fibrillation. Heart Rate: 121. Ectopy: No Ectopy. Conduction: Normal. ST Segments: Normal ST Segments. T Waves: Normal. Axis: Normal. Clinical Impression: Atrial Fibrillation with RVR       Vitals:    01/18/18 1828 01/18/18 1846 01/18/18 1853 01/18/18 1903   BP: (!) 158/98 (!) 152/102  (!) 159/104   Pulse: (!) 128 (!) 127 (!) 136 (!) 127   Resp: (!) 23 (!) 23  (!) 28   Temp:       TempSrc:       SpO2: 96% 96%  95%   Weight:       Height:        01/18/18 1916 01/18/18 1925 01/18/18 1946 01/18/18 1949   BP: (!) 153/102  (!) 151/101 (!) 162/105   Pulse: (!) 127  (!) 128    Resp: (!) 24  (!) 21    Temp:  97.7 °F (36.5 °C)     TempSrc:  Oral     SpO2: (!) 93%  (!) 94%    Weight:       Height:        01/18/18 2001 01/18/18 2032 01/18/18 2047 01/18/18 2102   BP: (!) 141/87 (!) 144/94 (!) 146/96 (!) 155/103   Pulse: (!) 122 (!) 123 (!) 132 (!) 139   Resp: (!) 33 (!) 41 (!) 24 (!) 29   Temp:       TempSrc:       SpO2: (!) 92% (!) 94% 95% 95%   Weight:       Height:        01/18/18 2114 01/18/18 2217 01/18/18 2232   BP:  (!) 159/103 (!) 174/109   Pulse: (!) 133 (!) 130 (!) 131   Resp: (!) 26 (!) 30 (!) 27   Temp:      TempSrc:      SpO2: 95% (!) 92% (!) 94%   Weight:      Height:          Results for orders placed or performed during the hospital encounter of 01/18/18   APTT   Result Value Ref Range    aPTT 31.6 21.0 - 32.0 sec   Brain natriuretic  peptide   Result Value Ref Range    BNP 3,324 (H) 0 - 99 pg/mL   CBC auto differential   Result Value Ref Range    WBC 10.11 3.90 - 12.70 K/uL    RBC 3.13 (L) 4.60 - 6.20 M/uL    Hemoglobin 9.1 (L) 14.0 - 18.0 g/dL    Hematocrit 28.6 (L) 40.0 - 54.0 %    MCV 91 82 - 98 fL    MCH 29.1 27.0 - 31.0 pg    MCHC 31.8 (L) 32.0 - 36.0 g/dL    RDW 17.4 (H) 11.5 - 14.5 %    Platelets 148 (L) 150 - 350 K/uL    MPV 10.7 9.2 - 12.9 fL    Gran # 5.8 1.8 - 7.7 K/uL    Lymph # 2.3 1.0 - 4.8 K/uL    Mono # 1.8 (H) 0.3 - 1.0 K/uL    Eos # 0.1 0.0 - 0.5 K/uL    Baso # 0.02 0.00 - 0.20 K/uL    Gran% 57.7 38.0 - 73.0 %    Lymph% 22.6 18.0 - 48.0 %    Mono% 17.6 (H) 4.0 - 15.0 %    Eosinophil% 0.9 0.0 - 8.0 %    Basophil% 0.2 0.0 - 1.9 %    Differential Method Automated    Comprehensive metabolic panel   Result Value Ref Range    Sodium 142 136 - 145 mmol/L    Potassium 4.2 3.5 - 5.1 mmol/L    Chloride 101 95 - 110 mmol/L    CO2 24 23 - 29 mmol/L    Glucose 90 70 - 110 mg/dL    BUN, Bld 102 (H) 6 - 20 mg/dL    Creatinine 16.3 (H) 0.5 - 1.4 mg/dL    Calcium 8.6 (L) 8.7 - 10.5 mg/dL    Total Protein 6.6 6.0 - 8.4 g/dL    Albumin 2.6 (L) 3.5 - 5.2 g/dL    Total Bilirubin 0.4 0.1 - 1.0 mg/dL    Alkaline Phosphatase 34 (L) 55 - 135 U/L    AST 40 10 - 40 U/L    ALT 29 10 - 44 U/L    Anion Gap 17 (H) 8 - 16 mmol/L    eGFR if African American 3.2 (A) >60 mL/min/1.73 m^2    eGFR if non  2.8 (A) >60 mL/min/1.73 m^2   Lactic acid, plasma #1   Result Value Ref Range    Lactate (Lactic Acid) 0.6 0.5 - 2.2 mmol/L   Lipase   Result Value Ref Range    Lipase 48 4 - 60 U/L   Magnesium   Result Value Ref Range    Magnesium 2.1 1.6 - 2.6 mg/dL   Phosphorus   Result Value Ref Range    Phosphorus 7.2 (H) 2.7 - 4.5 mg/dL   Protime-INR   Result Value Ref Range    Prothrombin Time 12.0 9.0 - 12.5 sec    INR 1.2 0.8 - 1.2   Troponin I   Result Value Ref Range    Troponin I 0.083 (H) 0.000 - 0.026 ng/mL   TSH   Result Value Ref Range    TSH 0.632  0.400 - 4.000 uIU/mL   Influenza antigen Nasopharyngeal Swab   Result Value Ref Range    Influenza A Ag, EIA Negative Negative    Influenza B Ag, EIA Negative Negative    Flu A & B Source Nasopharyngeal Swab    ISTAT PROCEDURE   Result Value Ref Range    POC PH 7.517 (H) 7.35 - 7.45    POC PCO2 32.2 (L) 35 - 45 mmHg    POC PO2 57 (LL) 80 - 100 mmHg    POC HCO3 26.1 24 - 28 mmol/L    POC BE 3 -2 to 2 mmol/L    POC SATURATED O2 92 (L) 95 - 100 %    Sample ARTERIAL     Site RR     Allens Test Pass     DelSys Room Air     Mode SPONT     FiO2 21          Imaging Results          X-Ray Chest AP Portable (Final result)  Result time 01/18/18 19:22:29    Final result by Asya Ventura MD (Timothy) (01/18/18 19:22:29)                 Impression:     Comparison 12/15/2017.    Atherosclerosis of thoracic aorta.  Mild cardiomegaly.  Appear to be mild patchy interstitial infiltrates bilaterally likely correspond to edema.Followup is recommended.         Electronically signed by: ASYA VENTURA MD  Date:     01/18/18  Time:    19:22              Narrative:    Chest, 1 view.    Clinical History: Shortness of breath                              Medications   albuterol-ipratropium 2.5mg-0.5mg/3mL nebulizer solution 3 mL (3 mLs Nebulization Given 1/18/18 1903)   diltiaZEM injection 10 mg (10 mg Intravenous Given 1/18/18 1949)   furosemide injection 40 mg (40 mg Intravenous Given 1/18/18 2119)   albuterol-ipratropium 2.5mg-0.5mg/3mL nebulizer solution 3 mL (3 mLs Nebulization Given 1/18/18 2114)         7:33 PM - Re-evaluation:  The patient is resting comfortably and is in no acute distress. Discussed test results and notified of pending labs. Answered questions at this time. Pt states he sees Dr. Alvarez (nephrology) for his ESRD at [facility] and would like to be transferred there if he needs to be admitted.    9:45 PM - Consult: Dr. Lundy discussed the case with Dr. Oliva at [facility] ER. Will accept transfer of the patient.  Accepting  Facility/Service: [facility]   Accepting Physician: Dr. Oliva     All historical, clinical, radiographic, and laboratory findings were reviewed with the patient/family in detail along with the indications for transfer to an outside facility (rather than admission to our facility in [place]) secondary to pt request to see his nephrologist and a need for  Treatment of fluid overload and afib c rvr given the diagnosis of afib c rvr, chf exacerbation.  All remaining questions and concerns were addressed at that time and the patient/family communicates understanding and agrees to proceed accordingly.  Similarly all pertinent details of the encounter were discussed with Dr. Oliva at [facility] who agrees to accept the patient in transfer based on the needs/patient preferences outlined above.  [facility] secondary to a need for ongoing Cardizem drip, cardiac monitoring, supplimental oxygen en route.  Vinnie Lundy MD  9:46 PM    Pre-hypertension/Hypertension: The pt has been informed that they may have pre-hypertension or hypertension based on a blood pressure reading in the ED. I recommend that the pt call the PCP listed on their discharge instructions or a physician of their choice this week to arrange f/u for further evaluation of possible pre-hypertension or hypertension.     Rom Miner Jr. was given a handout which discussed their disease process, precautions, and instructions for follow-up and therapy.        Discharge Medication List as of 1/18/2018 11:18 PM             ED Diagnosis  1. Atrial fibrillation with RVR    2. SOB (shortness of breath)    3. Acute congestive heart failure, unspecified congestive heart failure type    4. ESRD on peritoneal dialysis    5. Hypoxemia                             ED Course      Clinical Impression:   The primary encounter diagnosis was Atrial fibrillation with RVR. Diagnoses of SOB (shortness of breath), Acute congestive heart failure,  unspecified congestive heart failure type, ESRD on peritoneal dialysis, and Hypoxemia were also pertinent to this visit.    Disposition:   Disposition: Transferred  Condition: Stable                        Vinnie Lundy Jr., MD  01/19/18 0203

## 2018-01-19 NOTE — ED NOTES
LOC: The patient is awake, alert and aware of environment with an appropriate affect, the patient is oriented x 3 and speaking appropriately.  APPEARANCE: Patient appears mildly distressed with shortness of breath and chest pain.   HEENT: Brief WNL  SKIN: Brief WNL.   MUSCULOSKELETAL: Patient experiencing generalized weakness and bilateral leg aches.    RESPIRATORY: Patient reports shortness of breath and is hypoxic with sats of 91.  Patient also reports coughing up bloody sputum x last few days.   CARDIAC: Reports chest discomfort, especially when he coughs and is sinus tach.    GASTRO: Brief WNL  : Brief WNL, except pt does peratineal dialysis q 3 hours and has fistula to left upper arm.   Peripheral Vasc: Brief WNL  NEURO: Brief WNL  PSYCH: Brief WNL

## 2018-01-23 NOTE — ASSESSMENT & PLAN NOTE
Usually doing pretty well on dialysis but certainly this concerns me more considering his acute illness.

## 2018-01-23 NOTE — ASSESSMENT & PLAN NOTE
Considering his dyspnea and findings on lung exam along with low oxygen saturations referred to ER for further evaluation especially considering his comorbidities of being on dialysis and recent history of pneumonia.  He appeared pretty ill and fatigued in the exam room and I felt that outpatient treatment would not be prudent

## 2018-01-23 NOTE — PROGRESS NOTES
Subjective:       Patient ID: Rom Miner Jr. is a 57 y.o. male.    Chief Complaint: Shortness of Breath and Cough    HPI  Limited today to the clinic with several days of worsening symptoms of shortness of breath, fatigue and cough.  He also has some chest discomfort.  He has a past medical history of end-stage renal disease along with congestive heart failure.  He actually did have diagnosis of pneumonia within the last month and reports that his symptoms are similar to how they were at that time.    Family History   Problem Relation Age of Onset    No Known Problems Mother     Cirrhosis Father        Current Outpatient Prescriptions:     amlodipine (NORVASC) 10 MG tablet, Take 10 mg by mouth once daily., Disp: , Rfl:     AURYXIA 210 mg iron Tab, TAKE 2 TABLETS BY MOUTH THREE TIMES A DAY WITH MEALS AND TAKE 1 TABLET BY MOUTH TWO TIMES A DAY WITH SNACKS, Disp: , Rfl: 11    B&C/FERROUS FUM/FA/D3/ZINC OX (PRORENAL ORAL), Take by mouth., Disp: , Rfl:     BYSTOLIC 10 mg Tab, Take 10 mg by mouth once daily., Disp: , Rfl: 3    calcitRIOL (ROCALTROL) 0.5 MCG Cap, Take 0.5 mcg by mouth once daily., Disp: , Rfl:     ergocalciferol (VITAMIN D2) 50,000 unit Cap, Take 50,000 Units by mouth every 30 days., Disp: , Rfl:     furosemide (LASIX) 80 MG tablet, Take 80 mg by mouth every morning., Disp: , Rfl: 6    gentamicin (GARAMYCIN) 0.1 % cream, APPLY TO EXIT SITE ONCE DAILY, Disp: , Rfl: 2    hydrALAZINE (APRESOLINE) 25 MG tablet, Take 25 mg by mouth 3 (three) times daily., Disp: , Rfl: 3    ibuprofen (ADVIL,MOTRIN) 600 MG tablet, Take 1 tablet (600 mg total) by mouth every 8 (eight) hours as needed for Pain., Disp: 30 tablet, Rfl: 0    levocetirizine (XYZAL) 5 MG tablet, Take 5 mg by mouth every evening., Disp: , Rfl:     losartan (COZAAR) 100 MG tablet, Take 100 mg by mouth once daily., Disp: , Rfl:     magnesium oxide (MAG-OX) 400 mg tablet, Take 400 mg by mouth once daily., Disp: , Rfl:     mirtazapine  "(REMERON) 15 MG tablet, Take 15 mg by mouth every evening., Disp: , Rfl:     spironolactone (ALDACTONE) 50 MG tablet, Take 50 mg by mouth., Disp: , Rfl:     Review of Systems   Constitutional: Positive for fatigue. Negative for chills and fever.   Eyes: Negative for visual disturbance.   Respiratory: Positive for cough and shortness of breath.    Cardiovascular: Negative for chest pain.   Gastrointestinal: Negative for abdominal pain.   Neurological: Negative for dizziness.       Objective:   BP (!) 143/90 (BP Location: Left arm, Patient Position: Sitting, BP Method: X-Large (Manual))   Pulse (!) 124   Temp 98 °F (36.7 °C) (Tympanic)   Resp (!) 22   Ht 6' 1" (1.854 m)   Wt 99.1 kg (218 lb 7.6 oz)   SpO2 (!) 93%   BMI 28.82 kg/m²      Physical Exam   Constitutional: He is oriented to person, place, and time. He appears well-developed and well-nourished. He appears ill. No distress.   HENT:   Head: Normocephalic and atraumatic.   Nose: Nose normal.   Eyes: Conjunctivae and EOM are normal. Pupils are equal, round, and reactive to light. Right eye exhibits no discharge. Left eye exhibits no discharge.   Neck: No thyromegaly present.   Cardiovascular: Normal rate, regular rhythm and normal heart sounds.    No murmur heard.  Pulmonary/Chest: Effort normal. No respiratory distress. He has wheezes. He has rales.   Abdominal: Soft. He exhibits no distension.   Musculoskeletal: He exhibits no edema.   Neurological: He is alert and oriented to person, place, and time.   Skin: Skin is warm. No rash noted. He is not diaphoretic.   Psychiatric: He has a normal mood and affect. His behavior is normal.   Vitals reviewed.      Assessment & Plan     Problem List Items Addressed This Visit        Renal/    ESRD on peritoneal dialysis    Current Assessment & Plan     Usually doing pretty well on dialysis but certainly this concerns me more considering his acute illness.            Other    SOB (shortness of breath) - Primary "    Current Assessment & Plan     Considering his dyspnea and findings on lung exam along with low oxygen saturations referred to ER for further evaluation especially considering his comorbidities of being on dialysis and recent history of pneumonia.  He appeared pretty ill and fatigued in the exam room and I felt that outpatient treatment would not be prudent                 No Follow-up on file.

## 2018-01-24 LAB
BACTERIA BLD CULT: NORMAL
BACTERIA BLD CULT: NORMAL

## 2018-02-01 ENCOUNTER — OFFICE VISIT (OUTPATIENT)
Dept: INTERNAL MEDICINE | Facility: CLINIC | Age: 58
End: 2018-02-01
Payer: COMMERCIAL

## 2018-02-01 VITALS
WEIGHT: 212.31 LBS | SYSTOLIC BLOOD PRESSURE: 144 MMHG | TEMPERATURE: 96 F | DIASTOLIC BLOOD PRESSURE: 80 MMHG | HEIGHT: 73 IN | OXYGEN SATURATION: 99 % | BODY MASS INDEX: 28.14 KG/M2 | HEART RATE: 77 BPM | RESPIRATION RATE: 16 BRPM

## 2018-02-01 DIAGNOSIS — I48.91 ATRIAL FIBRILLATION WITH RVR: ICD-10-CM

## 2018-02-01 DIAGNOSIS — Z99.2 ESRD ON PERITONEAL DIALYSIS: Primary | ICD-10-CM

## 2018-02-01 DIAGNOSIS — I50.9 ACUTE CONGESTIVE HEART FAILURE, UNSPECIFIED CONGESTIVE HEART FAILURE TYPE: ICD-10-CM

## 2018-02-01 DIAGNOSIS — N18.6 ESRD ON PERITONEAL DIALYSIS: Primary | ICD-10-CM

## 2018-02-01 PROBLEM — R06.02 SOB (SHORTNESS OF BREATH): Status: RESOLVED | Noted: 2018-01-18 | Resolved: 2018-02-01

## 2018-02-01 PROCEDURE — 99214 OFFICE O/P EST MOD 30 MIN: CPT | Mod: S$GLB,,, | Performed by: FAMILY MEDICINE

## 2018-02-01 PROCEDURE — 99999 PR PBB SHADOW E&M-EST. PATIENT-LVL III: CPT | Mod: PBBFAC,,, | Performed by: FAMILY MEDICINE

## 2018-02-01 PROCEDURE — 3008F BODY MASS INDEX DOCD: CPT | Mod: S$GLB,,, | Performed by: FAMILY MEDICINE

## 2018-02-01 RX ORDER — METOPROLOL SUCCINATE 25 MG/1
25 TABLET, EXTENDED RELEASE ORAL 2 TIMES DAILY
COMMUNITY
End: 2018-10-03

## 2018-02-01 RX ORDER — SPIRONOLACTONE 50 MG/1
50 TABLET, FILM COATED ORAL DAILY
Qty: 90 TABLET | Refills: 3
Start: 2018-02-01 | End: 2018-09-17 | Stop reason: DRUGHIGH

## 2018-02-01 RX ORDER — SERTRALINE HYDROCHLORIDE 50 MG/1
50 TABLET, FILM COATED ORAL DAILY
Qty: 30 TABLET | Refills: 11 | Status: SHIPPED | OUTPATIENT
Start: 2018-02-01 | End: 2018-10-29

## 2018-02-01 RX ORDER — APIXABAN 5 MG/1
5 TABLET, FILM COATED ORAL 2 TIMES DAILY
Refills: 0 | COMMUNITY
Start: 2018-01-22 | End: 2018-04-13 | Stop reason: SDUPTHER

## 2018-02-01 RX ORDER — DILTIAZEM HYDROCHLORIDE 180 MG/1
180 CAPSULE, EXTENDED RELEASE ORAL DAILY
Refills: 0 | COMMUNITY
Start: 2018-01-22 | End: 2018-03-07 | Stop reason: SDUPTHER

## 2018-02-01 RX ORDER — LOSARTAN POTASSIUM 50 MG/1
50 TABLET ORAL NIGHTLY
Refills: 3 | COMMUNITY
Start: 2018-01-23 | End: 2018-02-01 | Stop reason: ALTCHOICE

## 2018-02-01 NOTE — MEDICAL/APP STUDENT
Subjective:       Patient ID: Rom Miner Jr. is a 57 y.o. male.    Chief Complaint: Follow-up (hospital admit - fluid ) and Leg Pain (left)    Patient is presenting for follow up after admission the the ED as well as an abrasion on their leg.  Since their admission to the ED the patient has been doing well, reporting only a mild cough at this point in time.  They were not discharged on any additional medications.  2 days ago the patient was compacting garbage in a garbage can and lost balance, hitting their left leg and side.  The patient's        Review of Systems    Objective:      Physical Exam    Assessment:       No diagnosis found.    Plan:

## 2018-02-07 NOTE — ASSESSMENT & PLAN NOTE
Continue adherence to dialysis.  Recommended that he follow up with me in a couple weeks to monitor her blood pressure control.

## 2018-02-07 NOTE — ASSESSMENT & PLAN NOTE
Rate controlled at this time.  Emphasized the importance of continuing to take Eliquis to prevent competition from atrial fibrillation.  Continue on metoprolol.  Cardizem was discontinued.  They are aware of this change

## 2018-02-07 NOTE — ASSESSMENT & PLAN NOTE
Medication added to his regimen was metoprolol.  He continues to be on spironolactone and Lasix.  No rales appreciated on my exam at this time.  Seems to be stabilized.

## 2018-02-07 NOTE — PROGRESS NOTES
Subjective:       Patient ID: Rom Miner Jr. is a 57 y.o. male.    Chief Complaint: Follow-up (hospital admit - fluid ) and Leg Pain (left)    HPI  Mr. Miner is here today to follow-up from recent hospitalization where he was found to have atrial fibrillation of new onset with concomitant CHF.  He has responded well and medication changes have been instituted including addition of Eliquis.  Not having any shortness of breath nor chest pain.  His wife is expressing concern that he seems more depressed and really just hasn't been doing as much as he used to do.  She says that she used to be upset when he was always hanging out with his friends and going places but now she wishes he would do more of that because he just seems to want to sit around the house and not do anything.    Family History   Problem Relation Age of Onset    No Known Problems Mother     Cirrhosis Father        Current Outpatient Prescriptions:     AURYXIA 210 mg iron Tab, TAKE 2 TABLETS BY MOUTH THREE TIMES A DAY WITH MEALS AND TAKE 1 TABLET BY MOUTH TWO TIMES A DAY WITH SNACKS, Disp: , Rfl: 11    calcitRIOL (ROCALTROL) 0.5 MCG Cap, Take 0.5 mcg by mouth once daily., Disp: , Rfl:     CARTIA  mg 24 hr capsule, Take 180 mg by mouth once daily., Disp: , Rfl: 0    ELIQUIS 5 mg Tab, Take 5 mg by mouth 2 (two) times daily., Disp: , Rfl: 0    furosemide (LASIX) 80 MG tablet, Take 80 mg by mouth every morning., Disp: , Rfl: 6    hydrALAZINE (APRESOLINE) 25 MG tablet, Take 25 mg by mouth 3 (three) times daily., Disp: , Rfl: 3    levocetirizine (XYZAL) 5 MG tablet, Take 5 mg by mouth every evening., Disp: , Rfl:     metoprolol succinate (TOPROL-XL) 25 MG 24 hr tablet, Take 25 mg by mouth once daily., Disp: , Rfl:     mirtazapine (REMERON) 15 MG tablet, Take 15 mg by mouth every evening., Disp: , Rfl:     amlodipine (NORVASC) 10 MG tablet, Take 10 mg by mouth once daily., Disp: , Rfl:     B&C/FERROUS FUM/FA/D3/ZINC OX (PRORENAL  "ORAL), Take by mouth., Disp: , Rfl:     ergocalciferol (VITAMIN D2) 50,000 unit Cap, Take 50,000 Units by mouth every 30 days., Disp: , Rfl:     gentamicin (GARAMYCIN) 0.1 % cream, APPLY TO EXIT SITE ONCE DAILY, Disp: , Rfl: 2    ibuprofen (ADVIL,MOTRIN) 600 MG tablet, Take 1 tablet (600 mg total) by mouth every 8 (eight) hours as needed for Pain., Disp: 30 tablet, Rfl: 0    magnesium oxide (MAG-OX) 400 mg tablet, Take 400 mg by mouth once daily., Disp: , Rfl:     sertraline (ZOLOFT) 50 MG tablet, Take 1 tablet (50 mg total) by mouth once daily. For depression, Disp: 30 tablet, Rfl: 11    spironolactone (ALDACTONE) 50 MG tablet, Take 1 tablet (50 mg total) by mouth once daily., Disp: 90 tablet, Rfl: 3    Review of Systems   Constitutional: Positive for fatigue. Negative for chills and fever.   Eyes: Negative for visual disturbance.   Respiratory: Negative for cough and shortness of breath.    Cardiovascular: Negative for chest pain.   Gastrointestinal: Negative for abdominal pain.   Neurological: Negative for dizziness.   Psychiatric/Behavioral: Positive for dysphoric mood. Negative for decreased concentration and sleep disturbance. The patient is not nervous/anxious.        Objective:   BP (!) 144/80 (BP Location: Right arm, Patient Position: Sitting, BP Method: Large (Manual))   Pulse 77   Temp 96.4 °F (35.8 °C) (Tympanic)   Resp 16   Ht 6' 1" (1.854 m)   Wt 96.3 kg (212 lb 4.9 oz)   SpO2 99%   BMI 28.01 kg/m²      Physical Exam   Constitutional: He is oriented to person, place, and time. He appears well-developed and well-nourished. No distress.   HENT:   Head: Normocephalic and atraumatic.   Nose: Nose normal.   Eyes: Conjunctivae and EOM are normal. Pupils are equal, round, and reactive to light. Right eye exhibits no discharge. Left eye exhibits no discharge.   Neck: No thyromegaly present.   Cardiovascular: Normal rate, regular rhythm and normal heart sounds.    No murmur heard.  Pulmonary/Chest: " Effort normal and breath sounds normal. No respiratory distress. He has no wheezes.   Abdominal: Soft. He exhibits no distension.   Musculoskeletal: He exhibits no edema.   Neurological: He is alert and oriented to person, place, and time.   Skin: Skin is warm. No rash noted. He is not diaphoretic.   Psychiatric: His behavior is normal.   Appeared to be down and was hanging his head a lot. Says that he was tired.    Vitals reviewed.      Assessment & Plan     Problem List Items Addressed This Visit        Cardiac/Vascular    Atrial fibrillation with RVR    Current Assessment & Plan     Rate controlled at this time.  Emphasized the importance of continuing to take Eliquis to prevent competition from atrial fibrillation.  Continue on metoprolol.  Cardizem was discontinued.  They are aware of this change         Acute congestive heart failure    Current Assessment & Plan     Medication added to his regimen was metoprolol.  He continues to be on spironolactone and Lasix.  No rales appreciated on my exam at this time.  Seems to be stabilized.            Renal/    ESRD on peritoneal dialysis - Primary    Current Assessment & Plan     Continue adherence to dialysis.  Recommended that he follow up with me in a couple weeks to monitor her blood pressure control.                 Follow-up in about 2 weeks (around 2/15/2018) for Blood pressure monitoring.

## 2018-02-15 ENCOUNTER — PATIENT OUTREACH (OUTPATIENT)
Dept: ADMINISTRATIVE | Facility: HOSPITAL | Age: 58
End: 2018-02-15

## 2018-02-15 ENCOUNTER — OFFICE VISIT (OUTPATIENT)
Dept: INTERNAL MEDICINE | Facility: CLINIC | Age: 58
End: 2018-02-15
Payer: COMMERCIAL

## 2018-02-15 ENCOUNTER — LAB VISIT (OUTPATIENT)
Dept: LAB | Facility: HOSPITAL | Age: 58
End: 2018-02-15
Attending: FAMILY MEDICINE
Payer: COMMERCIAL

## 2018-02-15 VITALS
TEMPERATURE: 97 F | BODY MASS INDEX: 26.91 KG/M2 | HEART RATE: 73 BPM | OXYGEN SATURATION: 96 % | HEIGHT: 73 IN | RESPIRATION RATE: 16 BRPM | DIASTOLIC BLOOD PRESSURE: 78 MMHG | SYSTOLIC BLOOD PRESSURE: 140 MMHG | WEIGHT: 203.06 LBS

## 2018-02-15 DIAGNOSIS — F32.A DEPRESSION, UNSPECIFIED DEPRESSION TYPE: ICD-10-CM

## 2018-02-15 DIAGNOSIS — I50.9 ACUTE CONGESTIVE HEART FAILURE, UNSPECIFIED CONGESTIVE HEART FAILURE TYPE: ICD-10-CM

## 2018-02-15 DIAGNOSIS — K52.9 CHRONIC DIARRHEA: Primary | ICD-10-CM

## 2018-02-15 PROCEDURE — 99999 PR PBB SHADOW E&M-EST. PATIENT-LVL III: CPT | Mod: PBBFAC,,, | Performed by: FAMILY MEDICINE

## 2018-02-15 PROCEDURE — 80061 LIPID PANEL: CPT

## 2018-02-15 PROCEDURE — 3008F BODY MASS INDEX DOCD: CPT | Mod: S$GLB,,, | Performed by: FAMILY MEDICINE

## 2018-02-15 PROCEDURE — 99214 OFFICE O/P EST MOD 30 MIN: CPT | Mod: S$GLB,,, | Performed by: FAMILY MEDICINE

## 2018-02-15 PROCEDURE — 36415 COLL VENOUS BLD VENIPUNCTURE: CPT | Mod: PO

## 2018-02-15 NOTE — PROGRESS NOTES
Subjective:       Patient ID: Rom Miner Jr. is a 57 y.o. male.    Chief Complaint: Follow-up (blood pressure)    HPI  Here today to follow-up from recent visit after he was discharged from the hospital for CHF decompensation after atrial fibrillation.  He has been doing well with no shortness of breath nor increased lower extremity edema.  Not having any chest pain.   Since the last visit he has been on Zoloft 50 mg daily and feels a difference.  His wife says he is less irritable and actually seems less depressed than he did before.  Not having any negative side effects with this.    Has been having diarrhea frequently for several months now.  Has tried Pepto-Bismol for this.  Has mentioned it to his nephrologist who said that this is a common side effect of dialysis.  Reports that he does have an upcoming appointment with gastroenterology within the next few weeks.    Reports last colonoscopy was 3-4 years ago.    Family History   Problem Relation Age of Onset    No Known Problems Mother     Cirrhosis Father        Current Outpatient Prescriptions:     amlodipine (NORVASC) 10 MG tablet, Take 10 mg by mouth once daily., Disp: , Rfl:     AURYXIA 210 mg iron Tab, TAKE 2 TABLETS BY MOUTH THREE TIMES A DAY WITH MEALS AND TAKE 1 TABLET BY MOUTH TWO TIMES A DAY WITH SNACKS, Disp: , Rfl: 11    calcitRIOL (ROCALTROL) 0.5 MCG Cap, Take 0.5 mcg by mouth once daily., Disp: , Rfl:     CARTIA  mg 24 hr capsule, Take 180 mg by mouth once daily., Disp: , Rfl: 0    ELIQUIS 5 mg Tab, Take 5 mg by mouth 2 (two) times daily., Disp: , Rfl: 0    ergocalciferol (VITAMIN D2) 50,000 unit Cap, Take 50,000 Units by mouth every 30 days., Disp: , Rfl:     furosemide (LASIX) 80 MG tablet, Take 80 mg by mouth every morning., Disp: , Rfl: 6    gentamicin (GARAMYCIN) 0.1 % cream, APPLY TO EXIT SITE ONCE DAILY, Disp: , Rfl: 2    hydrALAZINE (APRESOLINE) 25 MG tablet, Take 25 mg by mouth 3 (three) times daily., Disp: , Rfl:  "3    levocetirizine (XYZAL) 5 MG tablet, Take 5 mg by mouth every evening., Disp: , Rfl:     magnesium oxide (MAG-OX) 400 mg tablet, Take 400 mg by mouth once daily., Disp: , Rfl:     metoprolol succinate (TOPROL-XL) 25 MG 24 hr tablet, Take 25 mg by mouth once daily., Disp: , Rfl:     mirtazapine (REMERON) 15 MG tablet, Take 15 mg by mouth every evening., Disp: , Rfl:     sertraline (ZOLOFT) 50 MG tablet, Take 1 tablet (50 mg total) by mouth once daily. For depression, Disp: 30 tablet, Rfl: 11    spironolactone (ALDACTONE) 50 MG tablet, Take 1 tablet (50 mg total) by mouth once daily., Disp: 90 tablet, Rfl: 3    B&C/FERROUS FUM/FA/D3/ZINC OX (PRORENAL ORAL), Take by mouth., Disp: , Rfl:     Review of Systems   Constitutional: Negative for chills and fever.   Eyes: Negative for visual disturbance.   Respiratory: Negative for cough and shortness of breath.    Cardiovascular: Negative for chest pain.   Gastrointestinal: Positive for diarrhea (4-5 episodes daily. pepto doesn't help). Negative for abdominal pain.   Neurological: Negative for dizziness.       Objective:   BP (!) 140/78   Pulse 73   Temp 97.4 °F (36.3 °C) (Tympanic)   Resp 16   Ht 6' 1" (1.854 m)   Wt 92.1 kg (203 lb 0.7 oz)   SpO2 96%   BMI 26.79 kg/m²      Physical Exam   Constitutional: He is oriented to person, place, and time. He appears well-developed and well-nourished. No distress.   HENT:   Head: Normocephalic and atraumatic.   Nose: Nose normal.   Eyes: Conjunctivae and EOM are normal. Pupils are equal, round, and reactive to light. Right eye exhibits no discharge. Left eye exhibits no discharge.   Neck: No thyromegaly present.   Cardiovascular: Normal rate, regular rhythm and normal heart sounds.    No murmur heard.  Pulmonary/Chest: Effort normal and breath sounds normal. No respiratory distress. He has no wheezes.   Abdominal: Soft. He exhibits no distension.   Musculoskeletal: He exhibits no edema.   Neurological: He is alert " and oriented to person, place, and time.   Skin: Skin is warm. No rash noted. He is not diaphoretic.   Psychiatric: His behavior is normal.   Appears less depressed than last time.   Vitals reviewed.      Assessment & Plan     Problem List Items Addressed This Visit        Psychiatric    Depression    Current Assessment & Plan     Continue on zoloft. Has had some improvement            Cardiac/Vascular    Acute congestive heart failure    Current Assessment & Plan     Has responded well to therapy.  Not having any other symptoms at this time. Continue same meds         Relevant Orders    Lipid panel       GI    Chronic diarrhea - Primary    Current Assessment & Plan     Recommended following up with GI as this is likely a side effect of dialysis and I'm not sure what else we can do at this point.                  No Follow-up on file.

## 2018-02-15 NOTE — ASSESSMENT & PLAN NOTE
Recommended following up with GI as this is likely a side effect of dialysis and I'm not sure what else we can do at this point.

## 2018-02-16 LAB
CHOLEST SERPL-MCNC: 98 MG/DL
CHOLEST/HDLC SERPL: 3.4 {RATIO}
HDLC SERPL-MCNC: 29 MG/DL
HDLC SERPL: 29.6 %
LDLC SERPL CALC-MCNC: 54.6 MG/DL
NONHDLC SERPL-MCNC: 69 MG/DL
TRIGL SERPL-MCNC: 72 MG/DL

## 2018-03-07 RX ORDER — DILTIAZEM HYDROCHLORIDE 180 MG/1
CAPSULE, EXTENDED RELEASE ORAL
Qty: 30 CAPSULE | Refills: 5 | Status: SHIPPED | OUTPATIENT
Start: 2018-03-07 | End: 2018-10-03

## 2018-03-16 ENCOUNTER — PATIENT OUTREACH (OUTPATIENT)
Dept: ADMINISTRATIVE | Facility: HOSPITAL | Age: 58
End: 2018-03-16

## 2018-03-16 NOTE — LETTER
March 16, 2018      We are seeing Rom Miner , 1960, at [facility]. Royer Ta DO is their primary care physician. To help with our [place] maintenance records could you please send the following:     GI RECORDS (COLONOSCOPY)    Please fax to [facility] at [phone], attention Celena Esposito.     Thank-you in advance for your assistance. If you have any questions or concerns please contact me at [phone].     Celena PURCELL LPN  Care Coordination Department  [facility]  [phone]

## 2018-04-13 RX ORDER — APIXABAN 5 MG/1
5 TABLET, FILM COATED ORAL 2 TIMES DAILY
Qty: 30 TABLET | Refills: 0 | Status: SHIPPED | OUTPATIENT
Start: 2018-04-13 | End: 2018-05-14 | Stop reason: SDUPTHER

## 2018-04-14 ENCOUNTER — HOSPITAL ENCOUNTER (EMERGENCY)
Facility: HOSPITAL | Age: 58
Discharge: HOME | End: 2018-04-14
Attending: EMERGENCY MEDICINE
Payer: COMMERCIAL

## 2018-04-14 VITALS
DIASTOLIC BLOOD PRESSURE: 90 MMHG | WEIGHT: 202 LBS | SYSTOLIC BLOOD PRESSURE: 168 MMHG | BODY MASS INDEX: 26.77 KG/M2 | RESPIRATION RATE: 18 BRPM | HEIGHT: 73 IN | TEMPERATURE: 99 F | HEART RATE: 87 BPM | OXYGEN SATURATION: 98 %

## 2018-04-14 DIAGNOSIS — T14.90XA TRAUMA: ICD-10-CM

## 2018-04-14 DIAGNOSIS — M71.50 TRAUMATIC BURSITIS: Primary | ICD-10-CM

## 2018-04-14 DIAGNOSIS — R05.9 COUGH: ICD-10-CM

## 2018-04-14 PROCEDURE — 25000003 PHARM REV CODE 250: Performed by: EMERGENCY MEDICINE

## 2018-04-14 PROCEDURE — 99283 EMERGENCY DEPT VISIT LOW MDM: CPT

## 2018-04-14 RX ORDER — NAPROXEN 500 MG/1
500 TABLET ORAL
Status: COMPLETED | OUTPATIENT
Start: 2018-04-14 | End: 2018-04-14

## 2018-04-14 RX ORDER — TRAMADOL HYDROCHLORIDE 50 MG/1
100 TABLET ORAL EVERY 8 HOURS PRN
Qty: 12 TABLET | Refills: 1 | Status: SHIPPED | OUTPATIENT
Start: 2018-04-14 | End: 2018-04-24

## 2018-04-14 RX ORDER — NAPROXEN 500 MG/1
500 TABLET ORAL 2 TIMES DAILY PRN
Qty: 14 TABLET | Refills: 1 | Status: SHIPPED | OUTPATIENT
Start: 2018-04-14 | End: 2018-04-21

## 2018-04-14 RX ORDER — LOSARTAN POTASSIUM 50 MG/1
50 TABLET ORAL NIGHTLY
COMMUNITY

## 2018-04-14 RX ADMIN — NAPROXEN 500 MG: 500 TABLET ORAL at 09:04

## 2018-04-15 NOTE — ED PROVIDER NOTES
Encounter Date: 4/14/2018       History     Chief Complaint   Patient presents with    Elbow Injury     Reports swelling to R elbow from door hitting him in the arm approx 1 week ago.     Cough     approx 1 week ago. Productive cough with nasal congestion. Denies fever.      Dialysis patient, previously on hemodialysis currently does daily peritoneal dialysis at home.  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  At work about a week ago, the edge of a metal door swung and struck him in the right elbow, and he has had pain and swelling in the elbow since and it has gotten worse.  Inadequate relief with heat, Advil, Motrin, etc.  He also reports some degree of cough for about a week with nasal congestion, no fever or dyspnea.  Drove himself to the hospital.  Has a history of atrial fibrillation, on exam is in normal sinus rhythm.  No other complaints.      The history is provided by the patient. No  was used.     Review of patient's allergies indicates:  No Known Allergies  Past Medical History:   Diagnosis Date    Atrial fibrillation     Depression     Hypertension     Peritoneal dialysis status     Renal disorder      Past Surgical History:   Procedure Laterality Date    AV FISTULA PLACEMENT      CT guided Liver Biopsy  03/20/2017    [facility] [employer]-Trichrome stain: Positive for significant fibrosis. PAS w/database: Negative for PAS-D resistant intracellular globules. Prussian blue stain for iron: Positive for 2-3+ stainable iron deposition. Fibrous portal expansion: Present. Bridging fibrosis: Present     Family History   Problem Relation Age of Onset    No Known Problems Mother     Cirrhosis Father      Social History   Substance Use Topics    Smoking status: Former Smoker     Packs/day: 0.50    Smokeless tobacco: Never Used    Alcohol use No     Review of Systems   Constitutional: Negative for chills and fever.   HENT: Negative for congestion, facial swelling, nosebleeds  and sinus pressure.    Eyes: Negative for pain and redness.   Respiratory: Positive for cough. Negative for chest tightness, shortness of breath and wheezing.    Cardiovascular: Negative for chest pain, palpitations and leg swelling.   Gastrointestinal: Negative for abdominal distention, abdominal pain, diarrhea, nausea and vomiting.   Endocrine: Negative for cold intolerance, polydipsia and polyphagia.   Genitourinary: Negative for difficulty urinating, dysuria, frequency and hematuria.   Musculoskeletal: Positive for joint swelling. Negative for arthralgias, back pain, myalgias and neck pain.   Skin: Negative for color change and rash.   Neurological: Negative for dizziness, weakness, numbness and headaches.   Hematological: Negative for adenopathy. Does not bruise/bleed easily.   Psychiatric/Behavioral: Negative for agitation and behavioral problems.   All other systems reviewed and are negative.      Physical Exam     Initial Vitals [04/14/18 2013]   BP Pulse Resp Temp SpO2   (!) 187/101 91 18 98.8 °F (37.1 °C) 97 %      MAP       129.67         Physical Exam    Nursing note and vitals reviewed.  Constitutional: He appears well-developed and well-nourished. He is not diaphoretic. No distress.   HENT:   Head: Normocephalic and atraumatic.   Mouth/Throat: Oropharynx is clear and moist. No oropharyngeal exudate.   Eyes: Conjunctivae and EOM are normal. Pupils are equal, round, and reactive to light. Right eye exhibits no discharge. Left eye exhibits no discharge. No scleral icterus.   Neck: Normal range of motion. Neck supple. No thyromegaly present. No tracheal deviation present. No JVD present.   Cardiovascular: Normal rate, regular rhythm and normal heart sounds. Exam reveals no gallop and no friction rub.    No murmur heard.  Good thrill/ bruit LUE AV shunt   Pulmonary/Chest: Breath sounds normal. No respiratory distress. He has no wheezes. He has no rhonchi. He has no rales. He exhibits no tenderness.    Abdominal: Soft. Bowel sounds are normal. He exhibits no distension and no mass. There is no tenderness. There is no rebound and no guarding.   Musculoskeletal: He exhibits edema and tenderness.   Holding right arm in neutral flexed position, pain on range of motion for either flexion or extension as well as pronation or supination of the forearm.  Moderate soft tender swelling over the olecranon consistent with traumatic bursitis.  Moderate swelling also involves the elbow generally and the proximal portion of the forearm.  Intact distal pulses.  No erythema or warmth.   Lymphadenopathy:     He has no cervical adenopathy.   Neurological: He is alert and oriented to person, place, and time. He has normal strength. No cranial nerve deficit.   Skin: Skin is warm and dry. No rash noted. No erythema.   Psychiatric: He has a normal mood and affect. His behavior is normal. Judgment and thought content normal.         ED Course   Procedures  Labs Reviewed - No data to display     Imaging Results          X-Ray Elbow Complete Right (In process)                X-Ray Chest PA And Lateral (In process)                 X-Rays:   Independently Interpreted Readings:   Chest X-Ray: No infiltrates.  No acute abnormalities. Cardiomegaly present.   Other Readings:  Right elbow shows soft tissue swelling but no bony abnormality    9:31 PM Stable/ counseled in detail.                         Clinical Impression:     1. Traumatic bursitis    2. Cough    3. Trauma          Disposition:   Disposition: Discharged  Condition: Stable                        Zhou Aburto MD  04/14/18 2131       Zhou Aburto MD  04/14/18 2133

## 2018-05-14 RX ORDER — APIXABAN 5 MG/1
TABLET, FILM COATED ORAL
Qty: 30 TABLET | Refills: 0 | Status: SHIPPED | OUTPATIENT
Start: 2018-05-14 | End: 2018-10-08 | Stop reason: SDUPTHER

## 2018-06-11 ENCOUNTER — TELEPHONE (OUTPATIENT)
Dept: CARDIOLOGY | Facility: CLINIC | Age: 58
End: 2018-06-11

## 2018-06-11 DIAGNOSIS — M79.604 PAIN IN BOTH LOWER EXTREMITIES: ICD-10-CM

## 2018-06-11 DIAGNOSIS — M79.605 PAIN IN BOTH LOWER EXTREMITIES: ICD-10-CM

## 2018-06-11 DIAGNOSIS — I73.9 PERIPHERAL VASCULAR DISEASE, UNSPECIFIED: Primary | ICD-10-CM

## 2018-06-11 NOTE — TELEPHONE ENCOUNTER
Received orders from Dr. Sweeney's office for ABIs resting/walking   Copy of last office note also will get scanned to chart and if abnormal patient needs appointment with Dr. Maldonado

## 2018-08-01 RX ORDER — HYDRALAZINE HYDROCHLORIDE 25 MG/1
TABLET, FILM COATED ORAL
Qty: 90 TABLET | Refills: 0 | Status: SHIPPED | OUTPATIENT
Start: 2018-08-01 | End: 2018-08-17 | Stop reason: SDUPTHER

## 2018-08-01 RX ORDER — FUROSEMIDE 80 MG/1
TABLET ORAL
Qty: 30 TABLET | Refills: 0 | Status: SHIPPED | OUTPATIENT
Start: 2018-08-01 | End: 2018-08-31 | Stop reason: SDUPTHER

## 2018-08-01 NOTE — TELEPHONE ENCOUNTER
Spoke with pts family and informed them that the pts Rx was sent to the pharmacy for a 30 day supply but that if he wants anymore refills after that he will need to make a fu appt. She verbalized understanding and said she will let him know.

## 2018-08-17 RX ORDER — HYDRALAZINE HYDROCHLORIDE 25 MG/1
TABLET, FILM COATED ORAL
Qty: 90 TABLET | Refills: 0 | Status: SHIPPED | OUTPATIENT
Start: 2018-08-17 | End: 2018-08-31 | Stop reason: SDUPTHER

## 2018-08-17 NOTE — TELEPHONE ENCOUNTER
Pt requested refills. Pt is due for a follow up. 30 days supply was sent to pharmacy. Pt will need to f/u with myself PCP for additional refills. Please help pt schedule.

## 2018-08-20 ENCOUNTER — OFFICE VISIT (OUTPATIENT)
Dept: INTERNAL MEDICINE | Facility: CLINIC | Age: 58
End: 2018-08-20
Payer: COMMERCIAL

## 2018-08-20 VITALS
WEIGHT: 188.5 LBS | HEART RATE: 67 BPM | SYSTOLIC BLOOD PRESSURE: 156 MMHG | HEIGHT: 73 IN | BODY MASS INDEX: 24.98 KG/M2 | RESPIRATION RATE: 20 BRPM | OXYGEN SATURATION: 98 % | TEMPERATURE: 97 F | DIASTOLIC BLOOD PRESSURE: 84 MMHG

## 2018-08-20 DIAGNOSIS — G62.9 NEUROPATHY: ICD-10-CM

## 2018-08-20 DIAGNOSIS — Z11.59 ENCOUNTER FOR HEPATITIS C SCREENING TEST FOR LOW RISK PATIENT: Primary | ICD-10-CM

## 2018-08-20 PROCEDURE — 3008F BODY MASS INDEX DOCD: CPT | Mod: CPTII,S$GLB,, | Performed by: FAMILY MEDICINE

## 2018-08-20 PROCEDURE — 99214 OFFICE O/P EST MOD 30 MIN: CPT | Mod: S$GLB,,, | Performed by: FAMILY MEDICINE

## 2018-08-20 PROCEDURE — 99999 PR PBB SHADOW E&M-EST. PATIENT-LVL III: CPT | Mod: PBBFAC,,, | Performed by: FAMILY MEDICINE

## 2018-08-20 RX ORDER — SPIRONOLACTONE 100 MG/1
50 TABLET, FILM COATED ORAL DAILY
Refills: 4 | COMMUNITY
Start: 2018-08-17 | End: 2018-10-03

## 2018-08-20 RX ORDER — GABAPENTIN 300 MG/1
300 CAPSULE ORAL 3 TIMES DAILY
Qty: 90 CAPSULE | Refills: 11 | Status: SHIPPED | OUTPATIENT
Start: 2018-08-20 | End: 2018-09-17

## 2018-08-20 NOTE — ASSESSMENT & PLAN NOTE
Considering his longstanding history of dialysis and the character of his symptoms of his feet I think it is consistent with a neuropathy.  I recommended using gabapentin to see if this will alleviate his symptoms since both opioids and nonsteroidal anti-inflammatories which he cannot take very much of, have not seemed to help him.

## 2018-08-20 NOTE — PROGRESS NOTES
Subjective:       Patient ID: Rom Miner Jr. is a 58 y.o. male.    Chief Complaint: Joint Swelling (rt hand)    HPI  Came in today with concern about right-sided foot pain around his heel.  He has been having this for the past couple weeks.  He has been taking some Aleve along with tramadol and Tylenol without any relief.  He continues to work on a routine basis and has to wear heavy steel toe work boots.  He has tried using insoles but does not feel like this has helped at all.  He describes the pain as burning and shooting.  It sometimes affects him even at night.  He saw his nephrologist today and recently had blood work done.  Theyre continue to follow and continue with peritoneal dialysis.  Family History   Problem Relation Age of Onset    No Known Problems Mother     Cirrhosis Father        Current Outpatient Medications:     amlodipine (NORVASC) 10 MG tablet, Take 10 mg by mouth once daily., Disp: , Rfl:     AURYXIA 210 mg iron Tab, TAKE 2 TABLETS BY MOUTH THREE TIMES A DAY WITH MEALS AND TAKE 1 TABLET BY MOUTH TWO TIMES A DAY WITH SNACKS, Disp: , Rfl: 11    B&C/FERROUS FUM/FA/D3/ZINC OX (PRORENAL ORAL), Take by mouth., Disp: , Rfl:     calcitRIOL (ROCALTROL) 0.5 MCG Cap, Take 0.5 mcg by mouth once daily., Disp: , Rfl:     CARTIA  mg 24 hr capsule, Take 1 capsule by mouth daily., Disp: 30 capsule, Rfl: 5    ELIQUIS 5 mg Tab, TAKE 1 TABLET BY MOUTH TWICE DAILY, Disp: 30 tablet, Rfl: 0    ergocalciferol (VITAMIN D2) 50,000 unit Cap, Take 50,000 Units by mouth every 30 days., Disp: , Rfl:     furosemide (LASIX) 80 MG tablet, TAKE ONE TABLET BY MOUTH EVERY MORNING, Disp: 30 tablet, Rfl: 0    gentamicin (GARAMYCIN) 0.1 % cream, APPLY TO EXIT SITE ONCE DAILY, Disp: , Rfl: 2    hydrALAZINE (APRESOLINE) 25 MG tablet, TAKE ONE TABLET BY MOUTH THREE TIMES DAILY, Disp: 90 tablet, Rfl: 0    levocetirizine (XYZAL) 5 MG tablet, Take 5 mg by mouth every evening., Disp: , Rfl:     losartan (COZAAR) 50  "MG tablet, Take 50 mg by mouth nightly., Disp: , Rfl:     magnesium oxide (MAG-OX) 400 mg tablet, Take 400 mg by mouth once daily., Disp: , Rfl:     metoprolol succinate (TOPROL-XL) 25 MG 24 hr tablet, Take 25 mg by mouth 2 (two) times daily. , Disp: , Rfl:     mirtazapine (REMERON) 15 MG tablet, Take 15 mg by mouth every evening., Disp: , Rfl:     sertraline (ZOLOFT) 50 MG tablet, Take 1 tablet (50 mg total) by mouth once daily. For depression, Disp: 30 tablet, Rfl: 11    spironolactone (ALDACTONE) 100 MG tablet, Take 50 mg by mouth once daily., Disp: , Rfl: 4    spironolactone (ALDACTONE) 50 MG tablet, Take 1 tablet (50 mg total) by mouth once daily., Disp: 90 tablet, Rfl: 3    gabapentin (NEURONTIN) 300 MG capsule, Take 1 capsule (300 mg total) by mouth 3 (three) times daily., Disp: 90 capsule, Rfl: 11  No current facility-administered medications for this visit.     Review of Systems   Constitutional: Negative for chills and fever.   Eyes: Negative for visual disturbance.   Respiratory: Negative for cough and shortness of breath.    Cardiovascular: Negative for chest pain.   Gastrointestinal: Negative for abdominal pain.   Musculoskeletal: Positive for arthralgias.   Neurological: Negative for dizziness.       Objective:   BP (!) 156/84 (BP Location: Right arm, Patient Position: Sitting, BP Method: Large (Manual))   Pulse 67   Temp 96.6 °F (35.9 °C) (Tympanic)   Resp 20   Ht 6' 1" (1.854 m)   Wt 85.5 kg (188 lb 7.9 oz)   SpO2 98%   BMI 24.87 kg/m²      Physical Exam   Constitutional: He is oriented to person, place, and time. He appears well-developed and well-nourished.   HENT:   Head: Normocephalic and atraumatic.   Eyes: Conjunctivae are normal.   Cardiovascular: Normal rate.   Pulmonary/Chest: Effort normal. No respiratory distress.   Musculoskeletal: He exhibits no edema.        Feet:    Neurological: He is alert and oriented to person, place, and time. Coordination normal.   Skin: Skin is " warm and dry. No rash noted.   Psychiatric: He has a normal mood and affect. His behavior is normal.   Vitals reviewed.      Assessment & Plan     Problem List Items Addressed This Visit        Neuro    Neuropathy    Current Assessment & Plan     Considering his longstanding history of dialysis and the character of his symptoms of his feet I think it is consistent with a neuropathy.  I recommended using gabapentin to see if this will alleviate his symptoms since both opioids and nonsteroidal anti-inflammatories which he cannot take very much of, have not seemed to help him.           Other Visit Diagnoses     Encounter for hepatitis C screening test for low risk patient    -  Primary            Follow-up if symptoms worsen or fail to improve.

## 2018-08-31 RX ORDER — FUROSEMIDE 80 MG/1
TABLET ORAL
Qty: 30 TABLET | Refills: 0 | Status: SHIPPED | OUTPATIENT
Start: 2018-08-31 | End: 2018-10-08 | Stop reason: SDUPTHER

## 2018-08-31 RX ORDER — HYDRALAZINE HYDROCHLORIDE 25 MG/1
TABLET, FILM COATED ORAL
Qty: 90 TABLET | Refills: 0 | Status: SHIPPED | OUTPATIENT
Start: 2018-08-31 | End: 2018-10-08 | Stop reason: SDUPTHER

## 2018-09-17 ENCOUNTER — OFFICE VISIT (OUTPATIENT)
Dept: INTERNAL MEDICINE | Facility: CLINIC | Age: 58
End: 2018-09-17
Payer: COMMERCIAL

## 2018-09-17 VITALS
HEIGHT: 73 IN | WEIGHT: 194.25 LBS | SYSTOLIC BLOOD PRESSURE: 119 MMHG | TEMPERATURE: 99 F | HEART RATE: 60 BPM | OXYGEN SATURATION: 96 % | BODY MASS INDEX: 25.74 KG/M2 | DIASTOLIC BLOOD PRESSURE: 64 MMHG | RESPIRATION RATE: 18 BRPM

## 2018-09-17 DIAGNOSIS — G62.9 NEUROPATHY: ICD-10-CM

## 2018-09-17 DIAGNOSIS — Z99.2 HYPERTENSION DUE TO END STAGE RENAL DISEASE ON DIALYSIS: ICD-10-CM

## 2018-09-17 DIAGNOSIS — K52.9 CHRONIC DIARRHEA: Primary | ICD-10-CM

## 2018-09-17 DIAGNOSIS — I48.91 ATRIAL FIBRILLATION WITH RVR: ICD-10-CM

## 2018-09-17 DIAGNOSIS — N18.6 HYPERTENSION DUE TO END STAGE RENAL DISEASE ON DIALYSIS: ICD-10-CM

## 2018-09-17 DIAGNOSIS — Z99.2 STAGE 5 CHRONIC KIDNEY DISEASE ON CHRONIC DIALYSIS: ICD-10-CM

## 2018-09-17 DIAGNOSIS — I12.0 HYPERTENSION DUE TO END STAGE RENAL DISEASE ON DIALYSIS: ICD-10-CM

## 2018-09-17 DIAGNOSIS — N18.6 STAGE 5 CHRONIC KIDNEY DISEASE ON CHRONIC DIALYSIS: ICD-10-CM

## 2018-09-17 PROCEDURE — 99999 PR PBB SHADOW E&M-EST. PATIENT-LVL IV: CPT | Mod: PBBFAC,,, | Performed by: FAMILY MEDICINE

## 2018-09-17 PROCEDURE — 3008F BODY MASS INDEX DOCD: CPT | Mod: CPTII,S$GLB,, | Performed by: FAMILY MEDICINE

## 2018-09-17 PROCEDURE — 99214 OFFICE O/P EST MOD 30 MIN: CPT | Mod: S$GLB,,, | Performed by: FAMILY MEDICINE

## 2018-09-17 RX ORDER — NEBIVOLOL 10 MG/1
10 TABLET ORAL DAILY
COMMUNITY

## 2018-09-17 RX ORDER — SEVELAMER CARBONATE 800 MG/1
800 TABLET, FILM COATED ORAL
COMMUNITY
End: 2018-11-28

## 2018-09-17 RX ORDER — GABAPENTIN 300 MG/1
300 CAPSULE ORAL NIGHTLY
Qty: 90 CAPSULE | Refills: 3 | Status: SHIPPED | OUTPATIENT
Start: 2018-09-17 | End: 2019-09-17

## 2018-09-17 RX ORDER — CYCLOBENZAPRINE HCL 10 MG
10 TABLET ORAL 3 TIMES DAILY PRN
Qty: 90 TABLET | Refills: 2 | Status: SHIPPED | OUTPATIENT
Start: 2018-09-17 | End: 2018-09-17

## 2018-09-17 NOTE — ASSESSMENT & PLAN NOTE
He is seen Gastroenterology tomorrow.  He has taking Imodium in the past but that has not helped his diarrhea.  This has been a chronic problem ongoing for months and perhaps longer.

## 2018-09-17 NOTE — ASSESSMENT & PLAN NOTE
He has been taking gabapentin 3 times a day but I told him actually should be taking it less frequently due to the fact of his current renal status.  Recommended that he take the 300 mg only at night.

## 2018-09-17 NOTE — ASSESSMENT & PLAN NOTE
I discussed with him that I think a lot of his chronic problems of neuropathy and leg pain as well as diarrhea or secondary to chronic kidney disease and costly have an elevated creatinine along with electrolyte issues.  Stressed the importance of discussing these concerns with Nephrology as I feel like his nephrologist should make the most impact on these decisions especially which using new medications and adjustment of doses.

## 2018-09-17 NOTE — PROGRESS NOTES
Subjective:       Patient ID: Rom Miner Jr. is a 58 y.o. male.    Chief Complaint: Hospital Follow Up (OLOL)    HPI  Transitional Care Note    Family and/or Caretaker present at visit?  No.  Diagnostic tests reviewed/disposition: No diagnosic tests pending after this hospitalization.  Disease/illness education: discussed  Home health/community services discussion/referrals: Patient does not have home health established from hospital visit.  They do not need home health.  If needed, we will set up home health for the patient.   Establishment or re-establishment of referral orders for community resources: No other necessary community resources.   Discussion with other health care providers: Advised to keep f/u with nephrology. he has appt tomorrow with GI.     Patient was admitted to [facility] from 09/01 to 9/05 for acute pulmonary edema, hyperkalemia along with diarrhea and weakness.  He has a chronic history of paroxysmal atrial fibrillation as well as being on peritoneal dialysis for end-stage renal disease. He was found to have an exit site infection of the peritoneal dialysis catheter.    Overall doing okay today.  Not having shortness of breath that he presented to the hospital with.  He does not have any lower extremity edema. He still continues to have diarrhea intermittently which has been a chronic problem for a long time and we reviewed this at a previous visit back in February.  I had referred him the Gastroenterology.  He has an appointment with gastroenterology Dr. Malagon tomorrow.    He continues to struggle with chronic pain in his feet along with back pain and arm pain at times.  He describes the pain in his feet as burning.  He takes gabapentin reportedly 3 times a day and will take Tylenol for pain relief.  He said neither these really seem to help.  His wife was requesting maybe a muscle relaxer something else for pain however I discussed that it is tricky to  choose muscle relaxers due to his heart condition and some of the other interactions that are possible.      Family History   Problem Relation Age of Onset    No Known Problems Mother     Cirrhosis Father        Current Outpatient Medications:     amlodipine (NORVASC) 10 MG tablet, Take 10 mg by mouth once daily., Disp: , Rfl:     B,C/folic/zinc/selenometh/D3/E (RENAPLEX-D ORAL), Take 1 tablet by mouth once daily., Disp: , Rfl:     calcitRIOL (ROCALTROL) 0.5 MCG Cap, Take 0.5 mcg by mouth once daily., Disp: , Rfl:     CARTIA  mg 24 hr capsule, Take 1 capsule by mouth daily., Disp: 30 capsule, Rfl: 5    ELIQUIS 5 mg Tab, TAKE 1 TABLET BY MOUTH TWICE DAILY, Disp: 30 tablet, Rfl: 0    ergocalciferol (VITAMIN D2) 50,000 unit Cap, Take 50,000 Units by mouth every 30 days., Disp: , Rfl:     furosemide (LASIX) 80 MG tablet, TAKE ONE TABLET BY MOUTH EVERY MORNING, Disp: 30 tablet, Rfl: 0    gabapentin (NEURONTIN) 300 MG capsule, Take 1 capsule (300 mg total) by mouth every evening., Disp: 90 capsule, Rfl: 3    hydrALAZINE (APRESOLINE) 25 MG tablet, TAKE ONE TABLET BY MOUTH THREE TIMES DAILY, Disp: 90 tablet, Rfl: 0    levocetirizine (XYZAL) 5 MG tablet, Take 5 mg by mouth every evening., Disp: , Rfl:     loperamide HCl (IMODIUM A-D ORAL), Take by mouth., Disp: , Rfl:     losartan (COZAAR) 50 MG tablet, Take 50 mg by mouth nightly., Disp: , Rfl:     magnesium oxide (MAG-OX) 400 mg tablet, Take 400 mg by mouth once daily., Disp: , Rfl:     nebivolol (BYSTOLIC) 10 MG Tab, Take 10 mg by mouth once daily., Disp: , Rfl:     sertraline (ZOLOFT) 50 MG tablet, Take 1 tablet (50 mg total) by mouth once daily. For depression, Disp: 30 tablet, Rfl: 11    sevelamer carbonate (RENVELA) 800 mg Tab, Take 800 mg by mouth 3 (three) times daily with meals., Disp: , Rfl:     spironolactone (ALDACTONE) 100 MG tablet, Take 50 mg by mouth once daily., Disp: , Rfl: 4    AURYXIA 210 mg iron Tab, TAKE 2 TABLETS BY MOUTH  "THREE TIMES A DAY WITH MEALS AND TAKE 1 TABLET BY MOUTH TWO TIMES A DAY WITH SNACKS, Disp: , Rfl: 11    B&C/FERROUS FUM/FA/D3/ZINC OX (PRORENAL ORAL), Take by mouth., Disp: , Rfl:     gentamicin (GARAMYCIN) 0.1 % cream, APPLY TO EXIT SITE ONCE DAILY, Disp: , Rfl: 2    metoprolol succinate (TOPROL-XL) 25 MG 24 hr tablet, Take 25 mg by mouth 2 (two) times daily. , Disp: , Rfl:     mirtazapine (REMERON) 15 MG tablet, Take 15 mg by mouth every evening., Disp: , Rfl:     Review of Systems   Constitutional: Negative for chills and fever.   Eyes: Negative for visual disturbance.   Respiratory: Negative for cough and shortness of breath.    Cardiovascular: Negative for chest pain.   Gastrointestinal: Positive for diarrhea. Negative for abdominal pain.   Musculoskeletal: Positive for arthralgias.   Neurological: Negative for dizziness.       Objective:   /64   Pulse 60   Temp 98.8 °F (37.1 °C) (Oral)   Resp 18   Ht 6' 1" (1.854 m)   Wt 88.1 kg (194 lb 3.6 oz)   SpO2 96%   BMI 25.62 kg/m²      Physical Exam   Constitutional: He is oriented to person, place, and time. He appears well-developed and well-nourished.   HENT:   Head: Normocephalic and atraumatic.   Eyes: Conjunctivae are normal.   Cardiovascular: Normal rate.   Pulmonary/Chest: Effort normal. No respiratory distress.   Musculoskeletal: He exhibits no edema.   Neurological: He is alert and oriented to person, place, and time. Coordination normal.   Skin: Skin is warm and dry. No rash noted.   Psychiatric: He has a normal mood and affect. His behavior is normal.   Vitals reviewed.      Assessment & Plan     Problem List Items Addressed This Visit        Neuro    Neuropathy    Current Assessment & Plan     He has been taking gabapentin 3 times a day but I told him actually should be taking it less frequently due to the fact of his current renal status.  Recommended that he take the 300 mg only at night.         Relevant Orders    Ambulatory referral " to Podiatry       Cardiac/Vascular    Atrial fibrillation with RVR    Current Assessment & Plan     Rate controlled at this time.  Continue on same medication as well as anti thrombotic.            Renal/    Chronic kidney disease    Current Assessment & Plan     I discussed with him that I think a lot of his chronic problems of neuropathy and leg pain as well as diarrhea or secondary to chronic kidney disease and costly have an elevated creatinine along with electrolyte issues.  Stressed the importance of discussing these concerns with Nephrology as I feel like his nephrologist should make the most impact on these decisions especially which using new medications and adjustment of doses.         Hypertension due to end stage renal disease on dialysis    Current Assessment & Plan     Blood pressure actually controlled today.  Continue on same medications.            GI    Chronic diarrhea - Primary    Current Assessment & Plan     He is seen Gastroenterology tomorrow.  He has taking Imodium in the past but that has not helped his diarrhea.  This has been a chronic problem ongoing for months and perhaps longer.                 No Follow-up on file.

## 2018-10-01 ENCOUNTER — OFFICE VISIT (OUTPATIENT)
Dept: INTERNAL MEDICINE | Facility: CLINIC | Age: 58
End: 2018-10-01
Payer: COMMERCIAL

## 2018-10-01 VITALS
HEIGHT: 73 IN | WEIGHT: 173.5 LBS | BODY MASS INDEX: 22.99 KG/M2 | SYSTOLIC BLOOD PRESSURE: 89 MMHG | DIASTOLIC BLOOD PRESSURE: 52 MMHG | HEART RATE: 67 BPM

## 2018-10-01 DIAGNOSIS — Z99.2 HYPERTENSION DUE TO END STAGE RENAL DISEASE ON DIALYSIS: ICD-10-CM

## 2018-10-01 DIAGNOSIS — I12.0 HYPERTENSION DUE TO END STAGE RENAL DISEASE ON DIALYSIS: ICD-10-CM

## 2018-10-01 DIAGNOSIS — N18.6 HYPERTENSION DUE TO END STAGE RENAL DISEASE ON DIALYSIS: ICD-10-CM

## 2018-10-01 PROBLEM — I50.9 ACUTE CONGESTIVE HEART FAILURE: Status: RESOLVED | Noted: 2018-01-18 | Resolved: 2018-10-01

## 2018-10-01 PROCEDURE — 99999 PR PBB SHADOW E&M-EST. PATIENT-LVL III: CPT | Mod: PBBFAC,,, | Performed by: FAMILY MEDICINE

## 2018-10-01 PROCEDURE — 3008F BODY MASS INDEX DOCD: CPT | Mod: CPTII,S$GLB,, | Performed by: FAMILY MEDICINE

## 2018-10-01 PROCEDURE — 99214 OFFICE O/P EST MOD 30 MIN: CPT | Mod: S$GLB,,, | Performed by: FAMILY MEDICINE

## 2018-10-01 RX ORDER — CYCLOBENZAPRINE HCL 10 MG
10 TABLET ORAL 3 TIMES DAILY PRN
COMMUNITY
End: 2018-10-03

## 2018-10-01 RX ORDER — MUPIROCIN 20 MG/G
OINTMENT TOPICAL 3 TIMES DAILY
Qty: 30 G | Refills: 0 | Status: SHIPPED | OUTPATIENT
Start: 2018-10-01 | End: 2018-10-03

## 2018-10-01 RX ORDER — HYDROCODONE BITARTRATE AND ACETAMINOPHEN 5; 325 MG/1; MG/1
1 TABLET ORAL EVERY 6 HOURS PRN
COMMUNITY
End: 2018-11-28

## 2018-10-01 NOTE — ASSESSMENT & PLAN NOTE
No acute distress at this time.  Some orthostatic hypotension  Blood pressure actually low today.  I recommended that we start monitoring closely and decrease some of his medication.  Will start with stopping amlodipine.  Continue dialysis as directed.  Will have him follow up in 1 month.  If he continues to have symptomatic hypotension he needs to let us now.

## 2018-10-01 NOTE — PROGRESS NOTES
Subjective:       Patient ID: Rom Miner Jr. is a 58 y.o. male.    Chief Complaint: Dizziness and Hospital Follow Up    HPI  Started Going to HD and started last week. Was admitted to [facility] and had PD catheter removed. Going to HD Tu, Thur, Sat.  Has been feeling pretty good overall although he has had some episodes of feeling lightheaded.  His blood pressure is a bit low today although he reports it was normal at dialysis.  Taking all his medications as listed below.    Family History   Problem Relation Age of Onset    No Known Problems Mother     Cirrhosis Father        Current Outpatient Medications:     B,C/folic/zinc/selenometh/D3/E (RENAPLEX-D ORAL), Take 1 tablet by mouth once daily., Disp: , Rfl:     calcitRIOL (ROCALTROL) 0.5 MCG Cap, Take 0.5 mcg by mouth once daily., Disp: , Rfl:     CARTIA  mg 24 hr capsule, Take 1 capsule by mouth daily., Disp: 30 capsule, Rfl: 5    cyclobenzaprine (FLEXERIL) 10 MG tablet, Take 10 mg by mouth 3 (three) times daily as needed for Muscle spasms., Disp: , Rfl:     ELIQUIS 5 mg Tab, TAKE 1 TABLET BY MOUTH TWICE DAILY, Disp: 30 tablet, Rfl: 0    ergocalciferol (VITAMIN D2) 50,000 unit Cap, Take 50,000 Units by mouth every 30 days., Disp: , Rfl:     furosemide (LASIX) 80 MG tablet, TAKE ONE TABLET BY MOUTH EVERY MORNING, Disp: 30 tablet, Rfl: 0    gabapentin (NEURONTIN) 300 MG capsule, Take 1 capsule (300 mg total) by mouth every evening., Disp: 90 capsule, Rfl: 3    hydrALAZINE (APRESOLINE) 25 MG tablet, TAKE ONE TABLET BY MOUTH THREE TIMES DAILY, Disp: 90 tablet, Rfl: 0    HYDROcodone-acetaminophen (NORCO) 5-325 mg per tablet, Take 1 tablet by mouth every 6 (six) hours as needed for Pain., Disp: , Rfl:     levocetirizine (XYZAL) 5 MG tablet, Take 5 mg by mouth every evening., Disp: , Rfl:     losartan (COZAAR) 50 MG tablet, Take 50 mg by mouth nightly., Disp: , Rfl:     nebivolol (BYSTOLIC) 10 MG Tab, Take 10 mg by mouth once daily., Disp: , Rfl:      "sertraline (ZOLOFT) 50 MG tablet, Take 1 tablet (50 mg total) by mouth once daily. For depression, Disp: 30 tablet, Rfl: 11    AURYXIA 210 mg iron Tab, TAKE 2 TABLETS BY MOUTH THREE TIMES A DAY WITH MEALS AND TAKE 1 TABLET BY MOUTH TWO TIMES A DAY WITH SNACKS, Disp: , Rfl: 11    B&C/FERROUS FUM/FA/D3/ZINC OX (PRORENAL ORAL), Take by mouth., Disp: , Rfl:     gentamicin (GARAMYCIN) 0.1 % cream, APPLY TO EXIT SITE ONCE DAILY, Disp: , Rfl: 2    loperamide HCl (IMODIUM A-D ORAL), Take by mouth., Disp: , Rfl:     magnesium oxide (MAG-OX) 400 mg tablet, Take 400 mg by mouth once daily., Disp: , Rfl:     metoprolol succinate (TOPROL-XL) 25 MG 24 hr tablet, Take 25 mg by mouth 2 (two) times daily. , Disp: , Rfl:     mirtazapine (REMERON) 15 MG tablet, Take 15 mg by mouth every evening., Disp: , Rfl:     mupirocin (BACTROBAN) 2 % ointment, Apply topically 3 (three) times daily., Disp: 30 g, Rfl: 0    sevelamer carbonate (RENVELA) 800 mg Tab, Take 800 mg by mouth 3 (three) times daily with meals., Disp: , Rfl:     spironolactone (ALDACTONE) 100 MG tablet, Take 50 mg by mouth once daily., Disp: , Rfl: 4  No current facility-administered medications for this visit.     Review of Systems   Constitutional: Negative for chills and fever.   Eyes: Negative for visual disturbance.   Respiratory: Negative for cough and shortness of breath.    Cardiovascular: Negative for chest pain.   Gastrointestinal: Negative for abdominal pain.   Neurological: Negative for dizziness.       Objective:   BP (!) 89/52   Pulse 67   Ht 6' 1" (1.854 m)   Wt 78.7 kg (173 lb 8 oz)   BMI 22.89 kg/m²      Physical Exam   Constitutional: He is oriented to person, place, and time. He appears well-developed and well-nourished. No distress.   HENT:   Head: Normocephalic and atraumatic.   Nose: Nose normal.   Eyes: Conjunctivae and EOM are normal. Pupils are equal, round, and reactive to light. Right eye exhibits no discharge. Left eye exhibits no " discharge.   Neck: No thyromegaly present.   Cardiovascular: Normal rate, regular rhythm and normal heart sounds.   No murmur heard.  Pulmonary/Chest: Effort normal and breath sounds normal. No respiratory distress. He has no wheezes.   Abdominal: Soft. He exhibits no distension.   Musculoskeletal: He exhibits no edema.   Neurological: He is alert and oriented to person, place, and time.   Skin: Skin is warm. No rash noted. He is not diaphoretic.   Psychiatric: He has a normal mood and affect. His behavior is normal.   Vitals reviewed.      Assessment & Plan     Problem List Items Addressed This Visit        Renal/    Hypertension due to end stage renal disease on dialysis    Current Assessment & Plan     No acute distress at this time.  Some orthostatic hypotension  Blood pressure actually low today.  I recommended that we start monitoring closely and decrease some of his medication.  Will start with stopping amlodipine.  Continue dialysis as directed.  Will have him follow up in 1 month.  If he continues to have symptomatic hypotension he needs to let us now.                 Follow-up in about 4 weeks (around 10/29/2018) for Blood pressure monitoring.

## 2018-10-03 ENCOUNTER — HOSPITAL ENCOUNTER (EMERGENCY)
Facility: HOSPITAL | Age: 58
Discharge: HOME | End: 2018-10-03
Attending: EMERGENCY MEDICINE
Payer: COMMERCIAL

## 2018-10-03 VITALS
WEIGHT: 215 LBS | TEMPERATURE: 98 F | OXYGEN SATURATION: 98 % | HEART RATE: 78 BPM | BODY MASS INDEX: 27.59 KG/M2 | SYSTOLIC BLOOD PRESSURE: 139 MMHG | HEIGHT: 74 IN | DIASTOLIC BLOOD PRESSURE: 81 MMHG | RESPIRATION RATE: 16 BRPM

## 2018-10-03 DIAGNOSIS — E86.0 DEHYDRATION: ICD-10-CM

## 2018-10-03 DIAGNOSIS — R53.1 GENERALIZED WEAKNESS: Primary | ICD-10-CM
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
## 2018-10-03 DIAGNOSIS — S09.90XA INJURY OF HEAD, INITIAL ENCOUNTER: ICD-10-CM

## 2018-10-03 LAB
ALBUMIN SERPL BCP-MCNC: 2.3 G/DL
ALP SERPL-CCNC: 45 U/L
ALT SERPL W/O P-5'-P-CCNC: 16 U/L
ANION GAP SERPL CALC-SCNC: 14 MMOL/L
AST SERPL-CCNC: 25 U/L
BASOPHILS # BLD AUTO: 0.03 K/UL
BASOPHILS NFR BLD: 0.3 %
BILIRUB SERPL-MCNC: 0.2 MG/DL
BUN SERPL-MCNC: 59 MG/DL
CALCIUM SERPL-MCNC: 8.7 MG/DL
CHLORIDE SERPL-SCNC: 97 MMOL/L
CO2 SERPL-SCNC: 30 MMOL/L
CREAT SERPL-MCNC: 11.6 MG/DL
DIFFERENTIAL METHOD: ABNORMAL
EOSINOPHIL # BLD AUTO: 0 K/UL
EOSINOPHIL NFR BLD: 0.4 %
ERYTHROCYTE [DISTWIDTH] IN BLOOD BY AUTOMATED COUNT: 15.6 %
EST. GFR  (AFRICAN AMERICAN): 4.9 ML/MIN/1.73 M^2
EST. GFR  (NON AFRICAN AMERICAN): 4.2 ML/MIN/1.73 M^2
ETHANOL SERPL-MCNC: <10 MG/DL
GLUCOSE SERPL-MCNC: 94 MG/DL
HCT VFR BLD AUTO: 26.5 %
HGB BLD-MCNC: 8.3 G/DL
LYMPHOCYTES # BLD AUTO: 1.3 K/UL
LYMPHOCYTES NFR BLD: 12.7 %
MCH RBC QN AUTO: 29.1 PG
MCHC RBC AUTO-ENTMCNC: 31.3 G/DL
MCV RBC AUTO: 93 FL
MONOCYTES # BLD AUTO: 1.2 K/UL
MONOCYTES NFR BLD: 11.9 %
NEUTROPHILS # BLD AUTO: 7.6 K/UL
NEUTROPHILS NFR BLD: 74.4 %
PLATELET # BLD AUTO: 329 K/UL
PMV BLD AUTO: 9.4 FL
POTASSIUM SERPL-SCNC: 5.2 MMOL/L
PROT SERPL-MCNC: 6.7 G/DL
RBC # BLD AUTO: 2.85 M/UL
SODIUM SERPL-SCNC: 141 MMOL/L
TROPONIN I SERPL DL<=0.01 NG/ML-MCNC: 0.07 NG/ML
WBC # BLD AUTO: 10.23 K/UL

## 2018-10-03 PROCEDURE — 93010 ELECTROCARDIOGRAM REPORT: CPT | Mod: ,,, | Performed by: INTERNAL MEDICINE

## 2018-10-03 PROCEDURE — 85025 COMPLETE CBC W/AUTO DIFF WBC: CPT

## 2018-10-03 PROCEDURE — 99285 EMERGENCY DEPT VISIT HI MDM: CPT | Mod: 25

## 2018-10-03 PROCEDURE — 93005 ELECTROCARDIOGRAM TRACING: CPT

## 2018-10-03 PROCEDURE — 80053 COMPREHEN METABOLIC PANEL: CPT

## 2018-10-03 PROCEDURE — 96360 HYDRATION IV INFUSION INIT: CPT

## 2018-10-03 PROCEDURE — 90471 IMMUNIZATION ADMIN: CPT | Performed by: EMERGENCY MEDICINE

## 2018-10-03 PROCEDURE — 84484 ASSAY OF TROPONIN QUANT: CPT

## 2018-10-03 PROCEDURE — 63600175 PHARM REV CODE 636 W HCPCS: Performed by: EMERGENCY MEDICINE

## 2018-10-03 PROCEDURE — 99900035 HC TECH TIME PER 15 MIN (STAT)

## 2018-10-03 PROCEDURE — 80320 DRUG SCREEN QUANTALCOHOLS: CPT

## 2018-10-03 PROCEDURE — 90715 TDAP VACCINE 7 YRS/> IM: CPT | Performed by: EMERGENCY MEDICINE

## 2018-10-03 PROCEDURE — 96361 HYDRATE IV INFUSION ADD-ON: CPT

## 2018-10-03 PROCEDURE — 25000003 PHARM REV CODE 250: Performed by: EMERGENCY MEDICINE

## 2018-10-03 RX ORDER — METRONIDAZOLE 500 MG/1
500 TABLET ORAL 3 TIMES DAILY
COMMUNITY
Start: 2018-09-28 | End: 2018-10-05

## 2018-10-03 RX ORDER — AMLODIPINE BESYLATE 10 MG/1
10 TABLET ORAL DAILY
COMMUNITY
End: 2018-10-08

## 2018-10-03 RX ORDER — LEVOFLOXACIN 500 MG/1
500 TABLET, FILM COATED ORAL EVERY OTHER DAY
COMMUNITY
Start: 2018-09-28 | End: 2018-10-05

## 2018-10-03 RX ORDER — DILTIAZEM HYDROCHLORIDE EXTENDED-RELEASE TABLETS 180 MG/1
180 TABLET, EXTENDED RELEASE ORAL DAILY
COMMUNITY

## 2018-10-03 RX ADMIN — SODIUM CHLORIDE 500 ML: 0.9 INJECTION, SOLUTION INTRAVENOUS at 04:10

## 2018-10-03 RX ADMIN — SODIUM CHLORIDE 500 ML: 0.9 INJECTION, SOLUTION INTRAVENOUS at 03:10

## 2018-10-03 RX ADMIN — SODIUM CHLORIDE 1000 ML: 0.9 INJECTION, SOLUTION INTRAVENOUS at 06:10

## 2018-10-03 RX ADMIN — CLOSTRIDIUM TETANI TOXOID ANTIGEN (FORMALDEHYDE INACTIVATED), CORYNEBACTERIUM DIPHTHERIAE TOXOID ANTIGEN (FORMALDEHYDE INACTIVATED), BORDETELLA PERTUSSIS TOXOID ANTIGEN (GLUTARALDEHYDE INACTIVATED), BORDETELLA PERTUSSIS FILAMENTOUS HEMAGGLUTININ ANTIGEN (FORMALDEHYDE INACTIVATED), BORDETELLA PERTUSSIS PERTACTIN ANTIGEN, AND BORDETELLA PERTUSSIS FIMBRIAE 2/3 ANTIGEN 0.5 ML: 5; 2; 2.5; 5; 3; 5 INJECTION, SUSPENSION INTRAMUSCULAR at 06:10

## 2018-10-03 NOTE — ED PROVIDER NOTES
Encounter Date: 10/3/2018       History     Chief Complaint   Patient presents with    Altered Mental Status     Per [facility], pt at [employer] [place] & unable to maintain balance. Per [facility] report, witnessed pt fall several times. Pt denies ETOH.      Patient currently presents via EMS with concern regarding repeated falls today secondary to generalized weakness. Patient notes that he has been unsteady on his feet despite using his cane.  Patient did sustained impact to the occipital region of his scalp and has a persistent headache.  Patient is currently anticoagulated on Eliquis.  He is an ESRD patient and underwent hemodialysis yesterday and feels that he may have been dried out.  Patient had been performing peritoneal dialysis but had his catheter removed in early September secondary to infection.  Other pertinent medical issues include history of congestive heart failure, atrial fibrillation, and hypertension.          Review of patient's allergies indicates:  No Known Allergies  Past Medical History:   Diagnosis Date    Acute congestive heart failure 1/18/2018    Anemia in chronic kidney disease (CKD)     Anticoagulant long-term use     Arthritis     Atrial fibrillation     Depression     ESRD (end stage renal disease) on dialysis     Hemodialysis patient     Hepatitis C     Hypertension     Incarcerated umbilical hernia     Mild mental retardation     Peritoneal dialysis catheter in place 05/15/2017    Removed September 2018    PVD (peripheral vascular disease)     Renal disorder      Past Surgical History:   Procedure Laterality Date    AV FISTULA PLACEMENT      CT guided Liver Biopsy  03/20/2017    [facility] Synesis-Trichrome stain: Positive for significant fibrosis. PAS w/database: Negative for PAS-D resistant intracellular globules. Prussian blue stain for iron: Positive for 2-3+ stainable iron deposition. Fibrous portal expansion: Present. Bridging fibrosis: Present     Family History    Problem Relation Age of Onset    No Known Problems Mother     Cirrhosis Father      Social History     Tobacco Use    Smoking status: Current Some Day Smoker     Packs/day: 0.50     Types: Cigarettes    Smokeless tobacco: Never Used   Substance Use Topics    Alcohol use: Yes    Drug use: No     Review of Systems   Constitutional: Negative for chills and fever.   HENT: Negative for congestion.    Respiratory: Negative for chest tightness and shortness of breath.    Cardiovascular: Negative for chest pain and leg swelling.   Gastrointestinal: Negative for abdominal pain, constipation, diarrhea, nausea and vomiting.   Genitourinary: Negative for dysuria, frequency and urgency.   Skin: Negative for color change and rash.   Allergic/Immunologic: Negative for immunocompromised state.   Neurological: Positive for weakness and headaches. Negative for numbness.   Hematological: Negative for adenopathy. Does not bruise/bleed easily.   All other systems reviewed and are negative.      Physical Exam     Initial Vitals [10/03/18 0246]   BP Pulse Resp Temp SpO2   100/66 93 17 98.8 °F (37.1 °C) 96 %      MAP       --         Vitals:    10/03/18 0331 10/03/18 0346 10/03/18 0416 10/03/18 0431   BP: 114/61 129/67 132/72 119/63   Pulse: 84 83 84 82   Resp: 16 18 18 16   Temp:       TempSrc:       SpO2: 97% 95% 97% 98%   Weight:       Height:        10/03/18 0446 10/03/18 0501 10/03/18 0516 10/03/18 0546   BP: 120/63 123/65 125/66 112/61   Pulse: 80 80 81 80   Resp: 17 16 16 18   Temp: 98.4 °F (36.9 °C)      TempSrc: Oral      SpO2: 97% 96% 97% 98%   Weight:       Height:        10/03/18 0555 10/03/18 0558 10/03/18 0602 10/03/18 0616   BP: 131/69 121/73 (!) 98/59 135/76   Pulse: 76 82 94 80   Resp:    18   Temp:       TempSrc:       SpO2:    100%   Weight:       Height:        10/03/18 0631 10/03/18 0801 10/03/18 0816   BP: 131/75 133/78 139/81   Pulse: 80 77 78   Resp: 17 18 16   Temp:      TempSrc:      SpO2: 100% 99% 98%    Weight:      Height:            Physical Exam    Nursing note and vitals reviewed.  Constitutional: He appears well-developed and well-nourished. He is not diaphoretic. No distress.   HENT:   Head: Normocephalic and atraumatic.       Right Ear: External ear normal.   Left Ear: External ear normal.   Nose: Nose normal.   Mouth/Throat: Oropharynx is clear and moist.   Eyes: Conjunctivae and EOM are normal. Pupils are equal, round, and reactive to light. No scleral icterus.   Neck: Neck supple. No spinous process tenderness and no muscular tenderness present. No JVD present.   Cardiovascular: Normal rate, regular rhythm, normal heart sounds and intact distal pulses. Exam reveals no gallop and no friction rub.    No murmur heard.  Pulmonary/Chest: Breath sounds normal. No respiratory distress. He has no wheezes. He has no rhonchi. He has no rales.   Abdominal: Soft. Bowel sounds are normal. He exhibits no distension. There is no tenderness.   Musculoskeletal: Normal range of motion. He exhibits no edema.   Neurological: He is alert and oriented to person, place, and time.   Skin: Skin is warm and dry. No rash noted.   Psychiatric: He has a normal mood and affect. His behavior is normal.         ED Course   Procedures  Labs Reviewed   TROPONIN I - Abnormal; Notable for the following components:       Result Value    Troponin I 0.074 (*)     All other components within normal limits   COMPREHENSIVE METABOLIC PANEL - Abnormal; Notable for the following components:    Potassium 5.2 (*)     CO2 30 (*)     BUN, Bld 59 (*)     Creatinine 11.6 (*)     Albumin 2.3 (*)     Alkaline Phosphatase 45 (*)     eGFR if  4.9 (*)     eGFR if non  4.2 (*)     All other components within normal limits   CBC W/ AUTO DIFFERENTIAL - Abnormal; Notable for the following components:    RBC 2.85 (*)     Hemoglobin 8.3 (*)     Hematocrit 26.5 (*)     MCHC 31.3 (*)     RDW 15.6 (*)     Mono # 1.2 (*)     Gran% 74.4  (*)     Lymph% 12.7 (*)     All other components within normal limits   ALCOHOL,MEDICAL (ETHANOL)          Imaging Results          X-Ray Chest AP Portable (Final result)  Result time 10/03/18 07:28:12    Final result by ROE Chatterjee Sr., MD (10/03/18 07:28:12)                 Impression:      1. The lungs are clear.  2. There are mild osteoarthritic changes in the right acromioclavicular joint.  3. There are surgical clips projected lateral to the inferior aspect of the left side of the thoracic cage.  .      Electronically signed by: Fco Chatterjee MD  Date:    10/03/2018  Time:    07:28             Narrative:    EXAMINATION:  XR CHEST AP PORTABLE    CLINICAL HISTORY:  Syncope;    COMPARISON:  04/14/2018    FINDINGS:  The size of the heart is normal. The lungs are clear. There is no pneumothorax.  The costophrenic angles are sharp.  There are surgical clips projected lateral to the inferior aspect of the left side of the thoracic cage.  There are mild osteoarthritic changes in the right acromioclavicular joint.                               CT Head Without Contrast (Final result)  Result time 10/03/18 07:34:53    Final result by León Banegas MD (10/03/18 07:34:53)                 Impression:      Chronic microvascular ischemic changes.      Electronically signed by: León Banegas MD  Date:    10/03/2018  Time:    07:34             Narrative:    EXAMINATION:  CT HEAD WITHOUT CONTRAST    CLINICAL HISTORY:  Head trauma, headache;    TECHNIQUE:  Low dose axial CT images obtained throughout the head without intravenous contrast. Sagittal and coronal reconstructions were performed.  All CT scans at this facility use dose modulation, iterative reconstruction and/or weight based dosing when appropriate to reduce radiation dose to as low as reasonably achievable.    COMPARISON:  None.    FINDINGS:  Intracranial compartment:    The brain parenchyma demonstrates areas of decreased attenuation with mild to moderate  periventricular white matter consistent with chronic microvascular ischemic changes..  No parenchymal mass, hemorrhage, edema or major vascular distribution infarct.  Vascular calcifications are noted.    Mild prominence of the sulci and ventricles are consistent with age-related involutional changes.    No extra-axial blood or fluid collections.    Skull/extracranial contents (limited evaluation): No fracture. Mastoid air cells and paranasal sinuses are essentially clear.                                 Medical Decision Making:   ED Management:  Following discussion of all pertinent details presently available from the patient's encounter, the patient was transitioned into the care of  for ongoing evaluation and management.  At present the patient appears to be improved but remains orthostatic.  We will administer 1 additional L of fluid and reassess.  Although the patient's troponin level is elevated, it appears to be improved when compared to values over the past calendar year.  Overall clinical picture appears to be most consistent with that of dehydration likely secondary to recent dialysis.  Raymond Bonner MD  6:07 AM          Vitals:    10/03/18 0331 10/03/18 0346 10/03/18 0416 10/03/18 0431   BP: 114/61 129/67 132/72 119/63   Pulse: 84 83 84 82   Resp: 16 18 18 16   Temp:       TempSrc:       SpO2: 97% 95% 97% 98%   Weight:       Height:        10/03/18 0446 10/03/18 0501 10/03/18 0516 10/03/18 0546   BP: 120/63 123/65 125/66 112/61   Pulse: 80 80 81 80   Resp: 17 16 16 18   Temp: 98.4 °F (36.9 °C)      TempSrc: Oral      SpO2: 97% 96% 97% 98%   Weight:       Height:        10/03/18 0555 10/03/18 0558 10/03/18 0602 10/03/18 0616   BP: 131/69 121/73 (!) 98/59 135/76   Pulse: 76 82 94 80   Resp:    18   Temp:       TempSrc:       SpO2:    100%   Weight:       Height:        10/03/18 0631 10/03/18 0801 10/03/18 0816   BP: 131/75 133/78 139/81   Pulse: 80 77 78   Resp: 17 18 16   Temp:       TempSrc:      SpO2: 100% 99% 98%   Weight:      Height:          Results for orders placed or performed during the hospital encounter of 10/03/18   Troponin I   Result Value Ref Range    Troponin I 0.074 (H) 0.000 - 0.026 ng/mL   Ethanol   Result Value Ref Range    Alcohol, Medical, Serum <10 <10 mg/dL   Comprehensive metabolic panel   Result Value Ref Range    Sodium 141 136 - 145 mmol/L    Potassium 5.2 (H) 3.5 - 5.1 mmol/L    Chloride 97 95 - 110 mmol/L    CO2 30 (H) 23 - 29 mmol/L    Glucose 94 70 - 110 mg/dL    BUN, Bld 59 (H) 6 - 20 mg/dL    Creatinine 11.6 (H) 0.5 - 1.4 mg/dL    Calcium 8.7 8.7 - 10.5 mg/dL    Total Protein 6.7 6.0 - 8.4 g/dL    Albumin 2.3 (L) 3.5 - 5.2 g/dL    Total Bilirubin 0.2 0.1 - 1.0 mg/dL    Alkaline Phosphatase 45 (L) 55 - 135 U/L    AST 25 10 - 40 U/L    ALT 16 10 - 44 U/L    Anion Gap 14 8 - 16 mmol/L    eGFR if African American 4.9 (A) >60 mL/min/1.73 m^2    eGFR if non  4.2 (A) >60 mL/min/1.73 m^2   CBC auto differential   Result Value Ref Range    WBC 10.23 3.90 - 12.70 K/uL    RBC 2.85 (L) 4.60 - 6.20 M/uL    Hemoglobin 8.3 (L) 14.0 - 18.0 g/dL    Hematocrit 26.5 (L) 40.0 - 54.0 %    MCV 93 82 - 98 fL    MCH 29.1 27.0 - 31.0 pg    MCHC 31.3 (L) 32.0 - 36.0 g/dL    RDW 15.6 (H) 11.5 - 14.5 %    Platelets 329 150 - 350 K/uL    MPV 9.4 9.2 - 12.9 fL    Gran # (ANC) 7.6 1.8 - 7.7 K/uL    Lymph # 1.3 1.0 - 4.8 K/uL    Mono # 1.2 (H) 0.3 - 1.0 K/uL    Eos # 0.0 0.0 - 0.5 K/uL    Baso # 0.03 0.00 - 0.20 K/uL    Gran% 74.4 (H) 38.0 - 73.0 %    Lymph% 12.7 (L) 18.0 - 48.0 %    Mono% 11.9 4.0 - 15.0 %    Eosinophil% 0.4 0.0 - 8.0 %    Basophil% 0.3 0.0 - 1.9 %    Differential Method Automated          Imaging Results          X-Ray Chest AP Portable (Final result)  Result time 10/03/18 07:28:12    Final result by ROE Chatterjee Sr., MD (10/03/18 07:28:12)                 Impression:      1. The lungs are clear.  2. There are mild osteoarthritic changes in the right  acromioclavicular joint.  3. There are surgical clips projected lateral to the inferior aspect of the left side of the thoracic cage.  .      Electronically signed by: Fco Chatterjee MD  Date:    10/03/2018  Time:    07:28             Narrative:    EXAMINATION:  XR CHEST AP PORTABLE    CLINICAL HISTORY:  Syncope;    COMPARISON:  04/14/2018    FINDINGS:  The size of the heart is normal. The lungs are clear. There is no pneumothorax.  The costophrenic angles are sharp.  There are surgical clips projected lateral to the inferior aspect of the left side of the thoracic cage.  There are mild osteoarthritic changes in the right acromioclavicular joint.                               CT Head Without Contrast (Final result)  Result time 10/03/18 07:34:53    Final result by León Banegas MD (10/03/18 07:34:53)                 Impression:      Chronic microvascular ischemic changes.      Electronically signed by: León Banegas MD  Date:    10/03/2018  Time:    07:34             Narrative:    EXAMINATION:  CT HEAD WITHOUT CONTRAST    CLINICAL HISTORY:  Head trauma, headache;    TECHNIQUE:  Low dose axial CT images obtained throughout the head without intravenous contrast. Sagittal and coronal reconstructions were performed.  All CT scans at this facility use dose modulation, iterative reconstruction and/or weight based dosing when appropriate to reduce radiation dose to as low as reasonably achievable.    COMPARISON:  None.    FINDINGS:  Intracranial compartment:    The brain parenchyma demonstrates areas of decreased attenuation with mild to moderate periventricular white matter consistent with chronic microvascular ischemic changes..  No parenchymal mass, hemorrhage, edema or major vascular distribution infarct.  Vascular calcifications are noted.    Mild prominence of the sulci and ventricles are consistent with age-related involutional changes.    No extra-axial blood or fluid collections.    Skull/extracranial contents  (limited evaluation): No fracture. Mastoid air cells and paranasal sinuses are essentially clear.                                Medications   sodium chloride 0.9% bolus 500 mL (0 mLs Intravenous Stopped 10/3/18 0405)   sodium chloride 0.9% bolus 500 mL (0 mLs Intravenous Stopped 10/3/18 0520)   Tdap vaccine injection 0.5 mL (0.5 mLs Intramuscular Given 10/3/18 0627)   sodium chloride 0.9% bolus 1,000 mL (0 mLs Intravenous Stopped 10/3/18 0830)     6:05 AM - Transfer of Care: Patient care transferred from Dr. Bonner to  , pending reevaluation of blood pressure.      6:17 AM  - Re-evaluation:  The patient is resting comfortably and is in no acute distress.  I agree with Dr. Bonner's assessment. Discussed test results and notified of initial low bp on standing.  Pt states he does not feel dizzy/lightheaded when he stands up. Answered questions at this time. Will give one liter IVF and reassess.    6:58 AM - Re-evaluation: The patient is resting comfortably and is in no acute distress. He states that his symptoms have improved after treatment within ER.  Pt states he feels significantly better and asking when he can be discharged. Discussed test results, shared treatment plan, specific conditions for return, and importance of follow up with patient and family.  He understands and agrees with the plan as discussed. Answered  his questions at this time. He has remained hemodynamically stable throughout the ED course and is appropriate for discharge home.     Regarding DEHYDRATION, advised patient to call 911 if they should experience confusion, dizziness, lethargy, and/or lightheadedness.  The patient was instructed to contact their primary care provider immediately or return to the ED for any of the following symptoms: blood in the stool or vomit; diarrhea or vomiting for an extended period of time (vomiting > 24 hours or diarrhea for > 3 days); dry mouth or dry eyes; poor skin turgor; listlessness and inactiveness;  tachycardia; little or no urine output for 8 hours; inability to keep fluids down; and sunken eyes.  The patient was encouraged to drink plenty of water daily and to increase water intake when weather is hot or during exercise.    The patient has family members that will observe him/her over the next 24 hours and that are competent to bring him/her back to the Emergency Department if concerning signs or symptoms develop. The family members are comfortable with this responsibility.  I have given detailed written and verbal instructions to the family to bring the patient back to the ED should any concerning signs such as excessive sleepiness, lethargy, confusion, unequal pupils, recurrent vomiting, seizure activity, loss of consciousness, or focal weakness develop.    Pre-hypertension/Hypertension: The pt has been informed that they may have pre-hypertension or hypertension based on a blood pressure reading in the ED. I recommend that the pt call the PCP listed on their discharge instructions or a physician of their choice this week to arrange f/u for further evaluation of possible pre-hypertension or hypertension.     Rom Kristofer Cobian was given a handout which discussed their disease process, precautions, and instructions for follow-up and therapy.    Follow-up Information     Royer Ta DO. Schedule an appointment as soon as possible for a visit in 1 day.    Specialty:  Family Medicine  Why:  for reassessment  Contact information:  [address] [address]  [place] [address]  [phone]             Royer Ta DO. Schedule an appointment as soon as possible for a visit in 1 week.    Specialty:  Family Medicine  Contact information:  [address] [address]  [place] [address]  [phone]             [facility].    Specialty:  Emergency Medicine  Why:  As needed, If symptoms worsen  Contact information:  [address] [address] [id number] [phone]                    Medication  List      ASK your doctor about these medications    amLODIPine 10 MG tablet  Commonly known as:  NORVASC     calcitRIOL 0.5 MCG Cap  Commonly known as:  ROCALTROL     CARDIZEM  mg 24 hr tablet  Generic drug:  diltiaZEM     ELIQUIS 5 mg Tab  Generic drug:  apixaban  TAKE 1 TABLET BY MOUTH TWICE DAILY     furosemide 80 MG tablet  Commonly known as:  LASIX  TAKE ONE TABLET BY MOUTH EVERY MORNING     gabapentin 300 MG capsule  Commonly known as:  NEURONTIN  Take 1 capsule (300 mg total) by mouth every evening.     hydrALAZINE 25 MG tablet  Commonly known as:  APRESOLINE  TAKE ONE TABLET BY MOUTH THREE TIMES DAILY     HYDROcodone-acetaminophen 5-325 mg per tablet  Commonly known as:  NORCO     levocetirizine 5 MG tablet  Commonly known as:  XYZAL     levoFLOXacin 500 MG tablet  Commonly known as:  LEVAQUIN     losartan 50 MG tablet  Commonly known as:  COZAAR     magnesium oxide 400 mg tablet  Commonly known as:  MAG-OX     metroNIDAZOLE 500 MG tablet  Commonly known as:  FLAGYL     nebivolol 10 MG Tab  Commonly known as:  BYSTOLIC     PRORENAL ORAL     RENAPLEX-D ORAL     RENVELA 800 mg Tab  Generic drug:  sevelamer carbonate     sertraline 50 MG tablet  Commonly known as:  ZOLOFT  Take 1 tablet (50 mg total) by mouth once daily. For depression               ED Diagnosis  1. Generalized weakness    2. Dehydration    3. Fall, initial encounter    4. Injury of head, initial encounter                          Clinical Impression:   The primary encounter diagnosis was Generalized weakness. Diagnoses of Dehydration, Fall, initial encounter, and Injury of head, initial encounter were also pertinent to this visit.      Disposition:   Disposition: Discharged  Condition: Stable                        Vinnie Lundy Jr., MD  10/03/18 8391

## 2018-10-03 NOTE — ED NOTES
Attempt to cath patient for urine at this time, unsucessful. Will scan bladder to make sure pt has urine

## 2018-10-03 NOTE — ED NOTES
Dr. Bonner notified of inability to retrieve urine specimen & bladder scan results at this time. New orders to be placed.

## 2018-10-03 NOTE — DISCHARGE INSTRUCTIONS
Regarding DEHYDRATION, advised patient to call 911 if they should experience confusion, dizziness, lethargy, and/or lightheadedness.  The patient was instructed to contact their primary care provider immediately or return to the ED for any of the following symptoms: blood in the stool or vomit; diarrhea or vomiting for an extended period of time (vomiting > 24 hours or diarrhea for > 3 days); dry mouth or dry eyes; poor skin turgor; listlessness and inactiveness; tachycardia; little or no urine output for 8 hours; inability to keep fluids down; and sunken eyes.  The patient was encouraged to drink plenty of water daily and to increase water intake when weather is hot or during exercise.    The patient has family members that will observe him/her over the next 24 hours and that are competent to bring him/her back to the Emergency Department if concerning signs or symptoms develop. The family members are comfortable with this responsibility.  I have given detailed written and verbal instructions to the family to bring the patient back to the ED should any concerning signs such as excessive sleepiness, lethargy, confusion, unequal pupils, recurrent vomiting, seizure activity, loss of consciousness, or focal weakness develop.

## 2018-10-03 NOTE — ED NOTES
Pt sleeping, awakens easily to verbal stimuli, resp e/u, normal sinus rhythm on cardiac monitor, VSS, nad. Will continue to monitor.

## 2018-10-05 ENCOUNTER — TELEPHONE (OUTPATIENT)
Dept: INTERNAL MEDICINE | Facility: CLINIC | Age: 58
End: 2018-10-05

## 2018-10-05 NOTE — TELEPHONE ENCOUNTER
----- Message from Evelin Moreno sent at 10/5/2018  1:24 PM CDT -----  Contact: Zhou - Physical Therapist  States the pt has shortness of breath and poor oxy saturation when he moves around, drops to 74 during excertion, the pt is not ready to go back to work and needs a work excuse, please contact the pt at  [phone]///[id number]

## 2018-10-08 ENCOUNTER — OFFICE VISIT (OUTPATIENT)
Dept: INTERNAL MEDICINE | Facility: CLINIC | Age: 58
End: 2018-10-08
Payer: COMMERCIAL

## 2018-10-08 ENCOUNTER — LAB VISIT (OUTPATIENT)
Dept: LAB | Facility: HOSPITAL | Age: 58
End: 2018-10-08
Attending: FAMILY MEDICINE
Payer: COMMERCIAL

## 2018-10-08 VITALS
OXYGEN SATURATION: 99 % | DIASTOLIC BLOOD PRESSURE: 70 MMHG | HEIGHT: 74 IN | RESPIRATION RATE: 18 BRPM | TEMPERATURE: 97 F | SYSTOLIC BLOOD PRESSURE: 122 MMHG | HEART RATE: 81 BPM | BODY MASS INDEX: 23.17 KG/M2 | WEIGHT: 180.56 LBS

## 2018-10-08 DIAGNOSIS — N18.6 HYPERTENSION DUE TO END STAGE RENAL DISEASE ON DIALYSIS: Primary | ICD-10-CM

## 2018-10-08 DIAGNOSIS — I12.0 HYPERTENSION DUE TO END STAGE RENAL DISEASE ON DIALYSIS: Primary | ICD-10-CM

## 2018-10-08 DIAGNOSIS — Z99.2 HYPERTENSION DUE TO END STAGE RENAL DISEASE ON DIALYSIS: Primary | ICD-10-CM

## 2018-10-08 DIAGNOSIS — B18.2 CHRONIC HEPATITIS C WITHOUT HEPATIC COMA: ICD-10-CM

## 2018-10-08 DIAGNOSIS — Z12.11 COLON CANCER SCREENING: ICD-10-CM

## 2018-10-08 PROCEDURE — 99214 OFFICE O/P EST MOD 30 MIN: CPT | Mod: S$GLB,,, | Performed by: FAMILY MEDICINE

## 2018-10-08 PROCEDURE — 99999 PR PBB SHADOW E&M-EST. PATIENT-LVL IV: CPT | Mod: PBBFAC,,, | Performed by: FAMILY MEDICINE

## 2018-10-08 PROCEDURE — 36415 COLL VENOUS BLD VENIPUNCTURE: CPT | Mod: PO

## 2018-10-08 PROCEDURE — 3008F BODY MASS INDEX DOCD: CPT | Mod: CPTII,S$GLB,, | Performed by: FAMILY MEDICINE

## 2018-10-08 PROCEDURE — 87522 HEPATITIS C REVRS TRNSCRPJ: CPT

## 2018-10-08 RX ORDER — APIXABAN 5 MG/1
TABLET, FILM COATED ORAL
Qty: 30 TABLET | Refills: 3 | Status: SHIPPED | OUTPATIENT
Start: 2018-10-08

## 2018-10-08 RX ORDER — FUROSEMIDE 80 MG/1
TABLET ORAL
Qty: 30 TABLET | Refills: 0 | Status: SHIPPED | OUTPATIENT
Start: 2018-10-08 | End: 2018-11-08 | Stop reason: SDUPTHER

## 2018-10-08 RX ORDER — HYDRALAZINE HYDROCHLORIDE 25 MG/1
TABLET, FILM COATED ORAL
Qty: 90 TABLET | Refills: 0 | Status: SHIPPED | OUTPATIENT
Start: 2018-10-08 | End: 2018-12-10 | Stop reason: SDUPTHER

## 2018-10-08 NOTE — ASSESSMENT & PLAN NOTE
Needs to have colon cancer screening.  He is being considered to be on the renal transplant list and this is 1 of the thinks he must have done before then.

## 2018-10-08 NOTE — ASSESSMENT & PLAN NOTE
Patient's wife reports that he has not had recent follow-up testing for hepatitis C.  He was treated for this by Gastroenterology and was told that he needed repeat labs to verify durability of cure.

## 2018-10-08 NOTE — PROGRESS NOTES
Subjective:       Patient ID: Rom Miner Jr. is a 58 y.o. male.    Chief Complaint: Shortness of Breath and paperwork    HPI    Mr. Miner is here today following up from recent emergency room visit where he was seen after a syncopal episode.  This occurred in the context of recently having dialysis.  He has been having some challenges ever since transitioning to hemodialysis from peritoneal dialysis which occurred less than 2 weeks ago.  He was having some issues with hypotension.  At the last visit we discontinued Norvasc which he has not been taking.  He reports the syncopal episode came after he took 1 of the Norco tablets.  His wife was concerned about possible alcohol consumption however it was confirmed that he did not have alcohol in his system be a blood test in the emergency room.  He is feeling better today but still has some weakness.  Having physical therapy going out to the house who recommended that he is not yet ready to go back to work.  Was having some oxygen declines with exertion but today his oxygen level is 99%.  Family History   Problem Relation Age of Onset    No Known Problems Mother     Cirrhosis Father        Current Outpatient Medications:     B&C/FERROUS FUM/FA/D3/ZINC OX (PRORENAL ORAL), Take by mouth., Disp: , Rfl:     B,C/folic/zinc/selenometh/D3/E (RENAPLEX-D ORAL), Take 1 tablet by mouth once daily., Disp: , Rfl:     calcitRIOL (ROCALTROL) 0.5 MCG Cap, Take 0.5 mcg by mouth once daily., Disp: , Rfl:     diltiaZEM (CARDIZEM LA) 180 mg 24 hr tablet, Take 180 mg by mouth once daily., Disp: , Rfl:     ELIQUIS 5 mg Tab, TAKE 1 TABLET BY MOUTH TWICE DAILY, Disp: 30 tablet, Rfl: 0    furosemide (LASIX) 80 MG tablet, TAKE ONE TABLET BY MOUTH EVERY MORNING, Disp: 30 tablet, Rfl: 0    gabapentin (NEURONTIN) 300 MG capsule, Take 1 capsule (300 mg total) by mouth every evening., Disp: 90 capsule, Rfl: 3    hydrALAZINE (APRESOLINE) 25 MG tablet, TAKE ONE TABLET BY MOUTH THREE  "TIMES DAILY, Disp: 90 tablet, Rfl: 0    HYDROcodone-acetaminophen (NORCO) 5-325 mg per tablet, Take 1 tablet by mouth every 6 (six) hours as needed for Pain., Disp: , Rfl:     levocetirizine (XYZAL) 5 MG tablet, Take 5 mg by mouth every evening., Disp: , Rfl:     losartan (COZAAR) 50 MG tablet, Take 50 mg by mouth nightly., Disp: , Rfl:     magnesium oxide (MAG-OX) 400 mg tablet, Take 400 mg by mouth once daily., Disp: , Rfl:     nebivolol (BYSTOLIC) 10 MG Tab, Take 10 mg by mouth once daily., Disp: , Rfl:     sertraline (ZOLOFT) 50 MG tablet, Take 1 tablet (50 mg total) by mouth once daily. For depression, Disp: 30 tablet, Rfl: 11    sevelamer carbonate (RENVELA) 800 mg Tab, Take 800 mg by mouth 3 (three) times daily with meals., Disp: , Rfl:     Review of Systems   Constitutional: Negative for chills and fever.   Eyes: Negative for visual disturbance.   Respiratory: Negative for cough and shortness of breath.    Cardiovascular: Negative for chest pain.   Gastrointestinal: Negative for abdominal pain.   Neurological: Positive for weakness. Negative for dizziness.       Objective:   /70 (BP Location: Right arm, Patient Position: Sitting, BP Method: Medium (Automatic))   Pulse 81   Temp 97.3 °F (36.3 °C) (Tympanic)   Resp 18   Ht 6' 2" (1.88 m)   Wt 81.9 kg (180 lb 8.9 oz)   SpO2 99%   BMI 23.18 kg/m²      Physical Exam   Constitutional: He is oriented to person, place, and time. He appears well-developed and well-nourished.   HENT:   Head: Normocephalic and atraumatic.   Eyes: Conjunctivae are normal.   Cardiovascular: Normal rate.   Pulmonary/Chest: Effort normal. No respiratory distress.   Musculoskeletal: He exhibits no edema.   Neurological: He is alert and oriented to person, place, and time. Coordination normal.   Skin: Skin is warm and dry. No rash noted.   Psychiatric: He has a normal mood and affect. His behavior is normal.   Vitals reviewed.      Assessment & Plan     Problem List Items " Addressed This Visit        Renal/    Hypertension due to end stage renal disease on dialysis - Primary    Current Assessment & Plan       Referring to outpatient case management because he continues to have some challenges in regards to his renal replacement therapy.  One make sure that he has all resources available.  He is considering transitioning Nephrology care to [facility].  Currently he sees Dr. Phelps         Relevant Orders    Ambulatory referral to Outpatient Case Management       GI    Chronic hepatitis C without hepatic coma    Current Assessment & Plan      Patient's wife reports that he has not had recent follow-up testing for hepatitis C.  He was treated for this by Gastroenterology and was told that he needed repeat labs to verify durability of cure.         Relevant Orders    HEPATITIS C RNA, QUANTITATIVE, PCR    Colon cancer screening    Current Assessment & Plan       Needs to have colon cancer screening.  He is being considered to be on the renal transplant list and this is 1 of the thinks he must have done before then.         Relevant Orders    Case request GI: COLONOSCOPY (Completed)            No Follow-up on file.

## 2018-10-08 NOTE — ASSESSMENT & PLAN NOTE
Referring to outpatient case management because he continues to have some challenges in regards to his renal replacement therapy.  One make sure that he has all resources available.  He is considering transitioning Nephrology care to [facility].  Currently he sees Dr. Phelps

## 2018-10-09 ENCOUNTER — OUTPATIENT CASE MANAGEMENT (OUTPATIENT)
Dept: ADMINISTRATIVE | Facility: OTHER | Age: 58
End: 2018-10-09

## 2018-10-09 NOTE — PROGRESS NOTES
Please note the following patients information has been forwarded to [facility] for Case Management and/or .    Please see the media section in patient's chart for additional details.    Please contact Outpatient Complex care Management at ext [phone] with any questions.    Thank you,    Hilda Simons, [facility]  Outpatient Care Mgmt.  [phone]

## 2018-10-10 ENCOUNTER — DOCUMENTATION ONLY (OUTPATIENT)
Dept: ENDOSCOPY | Facility: HOSPITAL | Age: 58
End: 2018-10-10

## 2018-10-10 ENCOUNTER — PATIENT MESSAGE (OUTPATIENT)
Dept: INTERNAL MEDICINE | Facility: CLINIC | Age: 58
End: 2018-10-10

## 2018-10-10 LAB
HCV LOG: <1.08 LOG (10) IU/ML
HCV RNA QUANT PCR: <12 IU/ML
HCV, QUALITATIVE: NOT DETECTED IU/ML

## 2018-10-10 NOTE — PROGRESS NOTES
10/10/18 Per [employer], Person pressed touch tone key to speak with an endoscopy .  Pt wife stated he was recently hospitalized for CHF, also he take Eliquis. Wife stated she has not taken him back for cardiac follow-up. Informed het to call us back to schedule after his F/U visit with cardiology. Also informed her that we will be contacting Dr. Seven Claros concerning his recent hospital visit and to get clearance to hold the Eliquis. Pt also is on dialysis.

## 2018-10-11 ENCOUNTER — TELEPHONE (OUTPATIENT)
Dept: INTERNAL MEDICINE | Facility: CLINIC | Age: 58
End: 2018-10-11

## 2018-10-11 NOTE — TELEPHONE ENCOUNTER
----- Message from Deja Green sent at 10/11/2018  9:37 AM CDT -----  Contact: [facility])[phone]  Would like to consult with nurse regarding discharging patient from home health. Please call back at [phone]. Thanks/ar

## 2018-10-11 NOTE — TELEPHONE ENCOUNTER
Returned call to Evelia with Home health. She stated that pt stated that he was ok to go back to work on Monday. Verified that Dr. Ta gave pt a stated to return back to work on 10/15/2018. She stated she will have to discharge pt from home health.

## 2018-10-15 ENCOUNTER — PATIENT OUTREACH (OUTPATIENT)
Dept: INTERNAL MEDICINE | Facility: CLINIC | Age: 58
End: 2018-10-15

## 2018-10-18 ENCOUNTER — DOCUMENTATION ONLY (OUTPATIENT)
Dept: ENDOSCOPY | Facility: HOSPITAL | Age: 58
End: 2018-10-18

## 2018-10-18 NOTE — PROGRESS NOTES
Received clearance for pt to hold Eliquis 2 days prior to colonoscopy on 10/18/18. Form scanned to media tab.

## 2018-10-29 ENCOUNTER — DOCUMENTATION ONLY (OUTPATIENT)
Dept: ENDOSCOPY | Facility: HOSPITAL | Age: 58
End: 2018-10-29

## 2018-10-29 ENCOUNTER — OFFICE VISIT (OUTPATIENT)
Dept: INTERNAL MEDICINE | Facility: CLINIC | Age: 58
End: 2018-10-29
Payer: COMMERCIAL

## 2018-10-29 VITALS
HEIGHT: 74 IN | HEART RATE: 73 BPM | RESPIRATION RATE: 18 BRPM | SYSTOLIC BLOOD PRESSURE: 147 MMHG | OXYGEN SATURATION: 98 % | DIASTOLIC BLOOD PRESSURE: 83 MMHG | WEIGHT: 186.5 LBS | TEMPERATURE: 96 F | BODY MASS INDEX: 23.93 KG/M2

## 2018-10-29 DIAGNOSIS — Z99.2 HYPERTENSION DUE TO END STAGE RENAL DISEASE ON DIALYSIS: ICD-10-CM

## 2018-10-29 DIAGNOSIS — N18.6 HYPERTENSION DUE TO END STAGE RENAL DISEASE ON DIALYSIS: ICD-10-CM

## 2018-10-29 DIAGNOSIS — G62.9 NEUROPATHY: Primary | ICD-10-CM

## 2018-10-29 DIAGNOSIS — I12.0 HYPERTENSION DUE TO END STAGE RENAL DISEASE ON DIALYSIS: ICD-10-CM
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
## 2018-10-29 PROBLEM — R53.1 GENERALIZED WEAKNESS: Status: RESOLVED | Noted: 2018-10-03 | Resolved: 2018-10-29

## 2018-10-29 PROBLEM — E86.0 DEHYDRATION: Status: RESOLVED | Noted: 2018-10-03 | Resolved: 2018-10-29

## 2018-10-29 PROBLEM — S09.90XA HEAD INJURY: Status: RESOLVED | Noted: 2018-10-03 | Resolved: 2018-10-29

## 2018-10-29 PROBLEM — Z12.11 COLON CANCER SCREENING: Status: RESOLVED | Noted: 2018-10-08 | Resolved: 2018-10-29

## 2018-10-29 PROCEDURE — 99999 PR PBB SHADOW E&M-EST. PATIENT-LVL IV: CPT | Mod: PBBFAC,,, | Performed by: FAMILY MEDICINE

## 2018-10-29 PROCEDURE — 3008F BODY MASS INDEX DOCD: CPT | Mod: CPTII,S$GLB,, | Performed by: FAMILY MEDICINE

## 2018-10-29 PROCEDURE — 99214 OFFICE O/P EST MOD 30 MIN: CPT | Mod: S$GLB,,, | Performed by: FAMILY MEDICINE

## 2018-10-29 RX ORDER — DULOXETIN HYDROCHLORIDE 60 MG/1
60 CAPSULE, DELAYED RELEASE ORAL DAILY
Qty: 30 CAPSULE | Refills: 11 | Status: SHIPPED | OUTPATIENT
Start: 2018-10-29 | End: 2019-10-29

## 2018-10-29 NOTE — MEDICAL/APP STUDENT
"Subjective:       Patient ID: Rom Miner Jr. is a 58 y.o. male.    Chief Complaint: Follow-up (4 weeks) and Hypertension    Both legs hurting when he stands and walks. Reports tingling, and pain in ankles. In Hospital took tramadol. At home has tried aspirin, tylenol. Reports norco helps but he took the last one of those this morning. Physical therapy helped for awhile but has not been back because "time ran out." Reports he still does some of the exercises from PT each morning and rides a bike. 129-141 / 75-88.      Dialysis: [facility], Tuesday, Thursday, Saturday.   Would like to have dialysis earlier in the morning.   Is on kidney transplant list - [facility] NO, goes next month. Needs a copy of colonoscopy to take to that appointment.       Had colonoscopy 2-4 years ago somewhere off [facility].       Review of Systems    Objective:      Physical Exam    Assessment:       No diagnosis found.    Plan:         "

## 2018-10-29 NOTE — ASSESSMENT & PLAN NOTE
This is secondary to chronic renal insufficiency and has been difficult to treat.  Continuing on gabapentin but I recommended that we switch him from Zoloft to Cymbalta for its properties of chronic pain relief.  I also recommended that he start using compression stockings.

## 2018-10-29 NOTE — PROGRESS NOTES
Subjective:       Patient ID: Rom Miner Jr. is a 58 y.o. male.    Chief Complaint: Follow-up (4 weeks) and Hypertension    HPI  Came in today with a chief complaint of leg pain and burning.  This is always exacerbated after he is on his feet.  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  His legs also burn towards the end of the day and sometimes whenever he wakes up in the morning.  He has been taking gabapentin for awhile.  Has seen pain management quite some time ago but they had never started him on any specific treatment but had recommended exercise and possible injections in the past.    Family History   Problem Relation Age of Onset    No Known Problems Mother     Cirrhosis Father        Current Outpatient Medications:     B,C/folic/zinc/selenometh/D3/E (RENAPLEX-D ORAL), Take 1 tablet by mouth once daily., Disp: , Rfl:     calcitRIOL (ROCALTROL) 0.5 MCG Cap, Take 0.5 mcg by mouth once daily., Disp: , Rfl:     diltiaZEM (CARDIZEM LA) 180 mg 24 hr tablet, Take 180 mg by mouth once daily., Disp: , Rfl:     ELIQUIS 5 mg Tab, TAKE 1 TABLET BY MOUTH TWICE DAILY, Disp: 30 tablet, Rfl: 3    furosemide (LASIX) 80 MG tablet, TAKE ONE TABLET BY MOUTH EVERY MORNING, Disp: 30 tablet, Rfl: 0    gabapentin (NEURONTIN) 300 MG capsule, Take 1 capsule (300 mg total) by mouth every evening., Disp: 90 capsule, Rfl: 3    hydrALAZINE (APRESOLINE) 25 MG tablet, TAKE ONE TABLET BY MOUTH THREE TIMES DAILY, Disp: 90 tablet, Rfl: 0    HYDROcodone-acetaminophen (NORCO) 5-325 mg per tablet, Take 1 tablet by mouth every 6 (six) hours as needed for Pain., Disp: , Rfl:     levocetirizine (XYZAL) 5 MG tablet, Take 5 mg by mouth every evening., Disp: , Rfl:     losartan (COZAAR) 50 MG tablet, Take 50 mg by mouth nightly., Disp: , Rfl:     magnesium oxide (MAG-OX) 400 mg tablet, Take 400 mg by mouth once daily., Disp: , Rfl:     nebivolol (BYSTOLIC) 10  "MG Tab, Take 10 mg by mouth once daily., Disp: , Rfl:     sevelamer carbonate (RENVELA) 800 mg Tab, Take 800 mg by mouth 3 (three) times daily with meals., Disp: , Rfl:     B&C/FERROUS FUM/FA/D3/ZINC OX (PRORENAL ORAL), Take by mouth., Disp: , Rfl:     DULoxetine (CYMBALTA) 60 MG capsule, Take 1 capsule (60 mg total) by mouth once daily., Disp: 30 capsule, Rfl: 11    Review of Systems   Constitutional: Negative for chills and fever.   Eyes: Negative for visual disturbance.   Respiratory: Negative for cough and shortness of breath.    Cardiovascular: Negative for chest pain.   Gastrointestinal: Negative for abdominal pain.   Musculoskeletal: Positive for arthralgias.        And leg pain   Neurological: Negative for dizziness.       Objective:   BP (!) 147/83 (BP Location: Right arm, Patient Position: Sitting, BP Method: Medium (Automatic))   Pulse 73   Temp 96 °F (35.6 °C) (Tympanic)   Resp 18   Ht 6' 2" (1.88 m)   Wt 84.6 kg (186 lb 8.2 oz)   SpO2 98%   BMI 23.95 kg/m²      Physical Exam   Constitutional: He is oriented to person, place, and time. He appears well-developed and well-nourished.   HENT:   Head: Normocephalic and atraumatic.   Eyes: Conjunctivae are normal.   Cardiovascular: Normal rate.   Pulmonary/Chest: Effort normal. No respiratory distress.   Musculoskeletal: He exhibits no edema.   Neurological: He is alert and oriented to person, place, and time. Coordination normal.   Skin: Skin is warm and dry. No rash noted.   Psychiatric: He has a normal mood and affect. His behavior is normal.   Vitals reviewed.      Assessment & Plan     Problem List Items Addressed This Visit        Neuro    Neuropathy - Primary    Current Assessment & Plan     This is secondary to chronic renal insufficiency and has been difficult to treat.  Continuing on gabapentin but I recommended that we switch him from Zoloft to Cymbalta for its properties of chronic pain relief.  I also recommended that he start using " compression stockings.         Relevant Orders    COMPRESSION SLEEVE/SOCK FOR HOME USE       Renal/    Hypertension due to end stage renal disease on dialysis    Current Assessment & Plan     Patient and his wife were requesting a new cardiologist.  He did have an issue with atrial fibrillation with RVR during recent hospitalization and there was difficulty communicating with his cardiologist.         Relevant Orders    Ambulatory referral to Cardiology            No Follow-up on file.

## 2018-10-29 NOTE — ASSESSMENT & PLAN NOTE
Patient and his wife were requesting a new cardiologist.  He did have an issue with atrial fibrillation with RVR during recent hospitalization and there was difficulty communicating with his cardiologist.

## 2018-10-29 NOTE — PROGRESS NOTES
Endoscopy Scheduling Questionnaire:    Call Type:Pt spouse returned Televox call    1. Have you been admitted overnight to the hospital in the past 3 months? yes  2. Do you get CP and SOB while walking up a flight of stairs?   3. Have you had a stent placed in the past 12 months?  4. Have you had a stroke or heart attack in the past 6 months?   5. Have you had any chest pain in the past 3 months?       If so, have you been evaluated by your PCP or Cardiologist?  6. Do you take weight loss medications?   7. Have you been diagnosed with Diverticulitis within the past 3 months?   8. Are you having any GI symptoms that you feel need to be evaluated prior to your procedure?  9. Are you on dialysis? yes  10. Are you diabetic? no  11. Do you have any other health issues that you feel might limit your ability to safely have the procedure and/or sedation?   12. Is the patient over 81 yo? no        If so, has the patient been seen by their PCP or GI in the last 3 months? N/A       -I have reviewed the last colonoscopy for recommendations regarding surveillance and bowel prep.   -I have reviewed the patient's medications and allergies. He is on high risk medications and will require cardiac clearance  -I have verified the pharmacy information. The prep being used is No prep ordered. .     Date Endoscopy Scheduled: (GI office visit scheduled for 10/30/18)

## 2018-10-31 ENCOUNTER — TELEPHONE (OUTPATIENT)
Dept: ADMINISTRATIVE | Facility: CLINIC | Age: 58
End: 2018-10-31

## 2018-10-31 NOTE — TELEPHONE ENCOUNTER
Home Health SOC 10/02/2018 - 11/30/2018 with [facility] - Dr. Royer Ta. MSW services.

## 2018-11-08 RX ORDER — FUROSEMIDE 80 MG/1
TABLET ORAL
Qty: 30 TABLET | Refills: 0 | Status: SHIPPED | OUTPATIENT
Start: 2018-11-08 | End: 2018-12-10 | Stop reason: SDUPTHER

## 2018-11-12 ENCOUNTER — OFFICE VISIT (OUTPATIENT)
Dept: GASTROENTEROLOGY | Facility: CLINIC | Age: 58
End: 2018-11-12
Payer: COMMERCIAL

## 2018-11-12 ENCOUNTER — LAB VISIT (OUTPATIENT)
Dept: LAB | Facility: HOSPITAL | Age: 58
End: 2018-11-12
Attending: NURSE PRACTITIONER
Payer: COMMERCIAL

## 2018-11-12 VITALS
HEART RATE: 66 BPM | DIASTOLIC BLOOD PRESSURE: 90 MMHG | BODY MASS INDEX: 24.5 KG/M2 | SYSTOLIC BLOOD PRESSURE: 160 MMHG | HEIGHT: 74 IN | WEIGHT: 190.94 LBS

## 2018-11-12 DIAGNOSIS — Z79.01 CURRENT USE OF LONG TERM ANTICOAGULATION: ICD-10-CM

## 2018-11-12 DIAGNOSIS — I48.91 ATRIAL FIBRILLATION WITH RVR: ICD-10-CM

## 2018-11-12 DIAGNOSIS — B18.2 CHRONIC HEPATITIS C WITHOUT HEPATIC COMA: Primary | ICD-10-CM

## 2018-11-12 DIAGNOSIS — Z12.11 COLON CANCER SCREENING: ICD-10-CM

## 2018-11-12 DIAGNOSIS — B18.2 CHRONIC HEPATITIS C WITHOUT HEPATIC COMA: ICD-10-CM

## 2018-11-12 PROCEDURE — 87522 HEPATITIS C REVRS TRNSCRPJ: CPT

## 2018-11-12 PROCEDURE — 36415 COLL VENOUS BLD VENIPUNCTURE: CPT | Mod: PO

## 2018-11-12 PROCEDURE — 99204 OFFICE O/P NEW MOD 45 MIN: CPT | Mod: S$GLB,,, | Performed by: NURSE PRACTITIONER

## 2018-11-12 PROCEDURE — 3008F BODY MASS INDEX DOCD: CPT | Mod: CPTII,S$GLB,, | Performed by: NURSE PRACTITIONER

## 2018-11-12 PROCEDURE — 99999 PR PBB SHADOW E&M-EST. PATIENT-LVL III: CPT | Mod: PBBFAC,,, | Performed by: NURSE PRACTITIONER

## 2018-11-12 NOTE — PROGRESS NOTES
Clinic Consult:  [facility] Consultation Note    Reason for Consult:  The primary encounter diagnosis was Chronic hepatitis C without hepatic coma. Diagnoses of Colon cancer screening, Atrial fibrillation with RVR, and Current use of long term anticoagulation were also pertinent to this visit.    PCP: Royer Ta   No address on file    HPI:  This is a 58 y.o. male here to discuss need for colonoscopy  Pt states that he is needing a screening colonoscopy.  He is currently undergoing kidney transplant evaluation at [facility].  He denies any GI complaints.   Denies any abdominal pain.  No nausea or vomiting.  No change in bowel pattern.  No melena or hematochezia. No weight loss.  Last colonoscopy at [facility], unsure of results   He also reports a hx of HCV, treated with Harvoni.  Did not get SVR labs.  No upper GI bleeding.  No ascites or BLE.  No overt confusion.  He is on long term anticoagulation for A. Fib    Review of Systems   Constitutional: Negative for chills, fever, malaise/fatigue and weight loss.   Respiratory: Negative for cough.    Cardiovascular: Negative for chest pain.   Gastrointestinal:        Per HPI   Musculoskeletal: Negative for myalgias.   Skin: Negative for itching and rash.   Neurological: Negative for headaches.   Psychiatric/Behavioral: The patient is not nervous/anxious.        Medical History:   Past Medical History:   Diagnosis Date    Acute congestive heart failure 1/18/2018    Anemia in chronic kidney disease (CKD)     Anticoagulant long-term use     Arthritis     Atrial fibrillation     Depression     ESRD (end stage renal disease) on dialysis     Hemodialysis patient     Hepatitis C     Hypertension     Incarcerated umbilical hernia     Mild mental retardation     Peritoneal dialysis catheter in place 05/15/2017    Removed September 2018    PVD (peripheral vascular disease)     Renal disorder        Surgical History:  Past Surgical History:   Procedure  Laterality Date    AV FISTULA PLACEMENT      CT guided Liver Biopsy  03/20/2017    [facility] Notifixious-Trichrome stain: Positive for significant fibrosis. PAS w/database: Negative for PAS-D resistant intracellular globules. Prussian blue stain for iron: Positive for 2-3+ stainable iron deposition. Fibrous portal expansion: Present. Bridging fibrosis: Present       Family History:   Family History   Problem Relation Age of Onset    No Known Problems Mother     Cirrhosis Father        Social History:   Social History     Tobacco Use    Smoking status: Current Some Day Smoker     Packs/day: 0.50     Types: Cigarettes    Smokeless tobacco: Never Used   Substance Use Topics    Alcohol use: No    Drug use: No       Allergies: Reviewed    Home Medications:   Current Outpatient Medications on File Prior to Visit   Medication Sig Dispense Refill    B&C/FERROUS FUM/FA/D3/ZINC OX (PRORENAL ORAL) Take by mouth.      B,C/folic/zinc/selenometh/D3/E (RENAPLEX-D ORAL) Take 1 tablet by mouth once daily.      calcitRIOL (ROCALTROL) 0.5 MCG Cap Take 0.5 mcg by mouth once daily.      diltiaZEM (CARDIZEM LA) 180 mg 24 hr tablet Take 180 mg by mouth once daily.      DULoxetine (CYMBALTA) 60 MG capsule Take 1 capsule (60 mg total) by mouth once daily. 30 capsule 11    ELIQUIS 5 mg Tab TAKE 1 TABLET BY MOUTH TWICE DAILY 30 tablet 3    furosemide (LASIX) 80 MG tablet TAKE ONE TABLET BY MOUTH EVERY MORNING 30 tablet 0    gabapentin (NEURONTIN) 300 MG capsule Take 1 capsule (300 mg total) by mouth every evening. (Patient taking differently: Take 300 mg by mouth 3 (three) times daily. ) 90 capsule 3    hydrALAZINE (APRESOLINE) 25 MG tablet TAKE ONE TABLET BY MOUTH THREE TIMES DAILY 90 tablet 0    HYDROcodone-acetaminophen (NORCO) 5-325 mg per tablet Take 1 tablet by mouth every 6 (six) hours as needed for Pain.      levocetirizine (XYZAL) 5 MG tablet Take 5 mg by mouth every evening.      losartan (COZAAR) 50 MG tablet Take  "50 mg by mouth nightly.      magnesium oxide (MAG-OX) 400 mg tablet Take 400 mg by mouth once daily.      nebivolol (BYSTOLIC) 10 MG Tab Take 10 mg by mouth once daily.      sevelamer carbonate (RENVELA) 800 mg Tab Take 800 mg by mouth 3 (three) times daily with meals.       No current facility-administered medications on file prior to visit.        Physical Exam:  Vital Signs:  BP (!) 160/90   Pulse 66   Ht 6' 2" (1.88 m)   Wt 86.6 kg (190 lb 14.7 oz)   BMI 24.51 kg/m²   Body mass index is 24.51 kg/m².  Physical Exam    Labs: Pertinent labs reviewed.    Endoscopy:      CRC Screening:     Assessment:  1. Chronic hepatitis C without hepatic coma    2. Colon cancer screening    3. Atrial fibrillation with RVR    4. Current use of long term anticoagulation         Recommendations:  Chronic hepatitis C without hepatic coma  - Labs today to check for SVR  - Per care everywhere, recent CT with hepatomegaly.  -     HEPATITIS C RNA, QUANTITATIVE, PCR; Future; Expected date: 11/12/2018    Colon cancer screening  - plan for colonoscopy with miralax prep  - will get OK from cardiology to hold eliquis prior to procedure.   -     Case request GI: COLONOSCOPY    Atrial fibrillation with RVR    Current use of long term anticoagulation          Follow up to be determined by results of above.        Thank you so much for allowing me to participate in the care of YAMEL Frey Jr.  "

## 2018-11-14 LAB
HCV RNA SERPL NAA+PROBE-LOG IU: <1.08 LOG (10) IU/ML
HCV RNA SERPL QL NAA+PROBE: NOT DETECTED IU/ML
HCV RNA SPEC NAA+PROBE-ACNC: <12 IU/ML

## 2018-11-26 ENCOUNTER — TELEPHONE (OUTPATIENT)
Dept: GASTROENTEROLOGY | Facility: CLINIC | Age: 58
End: 2018-11-26

## 2018-11-26 NOTE — TELEPHONE ENCOUNTER
Called and spoke to pt's wife - told that they had an appt with Dr. Hair, Cardiologist, for his end-stage renal disease and clearance on the 23rd of November and they missed it.  She states she completely forgot about the appt.  I made another appt for pt to see Dr. Sen on Thursday of this week, the 29th.  Explained to pt's wife very important that they keep this appt as we need clearance to stop his Eliquis prior to his colonoscopy on 12/20/2018.  She verbalized her understanding.

## 2018-11-28 ENCOUNTER — OFFICE VISIT (OUTPATIENT)
Dept: CARDIOLOGY | Facility: CLINIC | Age: 58
End: 2018-11-28
Payer: COMMERCIAL

## 2018-11-28 VITALS
HEART RATE: 68 BPM | DIASTOLIC BLOOD PRESSURE: 80 MMHG | BODY MASS INDEX: 25.29 KG/M2 | HEIGHT: 74 IN | WEIGHT: 197.06 LBS | SYSTOLIC BLOOD PRESSURE: 140 MMHG

## 2018-11-28 DIAGNOSIS — N18.6 HYPERTENSION DUE TO END STAGE RENAL DISEASE ON DIALYSIS: ICD-10-CM

## 2018-11-28 DIAGNOSIS — I73.9 PVD (PERIPHERAL VASCULAR DISEASE): ICD-10-CM

## 2018-11-28 DIAGNOSIS — Z01.810 PREOP CARDIOVASCULAR EXAM: ICD-10-CM

## 2018-11-28 DIAGNOSIS — I12.0 HYPERTENSION DUE TO END STAGE RENAL DISEASE ON DIALYSIS: ICD-10-CM

## 2018-11-28 DIAGNOSIS — Z99.2 HYPERTENSION DUE TO END STAGE RENAL DISEASE ON DIALYSIS: ICD-10-CM

## 2018-11-28 DIAGNOSIS — I73.9 CLAUDICATION IN PERIPHERAL VASCULAR DISEASE: ICD-10-CM

## 2018-11-28 DIAGNOSIS — Z91.89 AT RISK FOR CORONARY ARTERY DISEASE: ICD-10-CM

## 2018-11-28 DIAGNOSIS — I48.0 PAF (PAROXYSMAL ATRIAL FIBRILLATION): Primary | ICD-10-CM

## 2018-11-28 PROCEDURE — 99245 OFF/OP CONSLTJ NEW/EST HI 55: CPT | Mod: S$GLB,,, | Performed by: INTERNAL MEDICINE

## 2018-11-28 PROCEDURE — 99999 PR PBB SHADOW E&M-EST. PATIENT-LVL III: CPT | Mod: PBBFAC,,, | Performed by: INTERNAL MEDICINE

## 2018-11-28 RX ORDER — CYCLOBENZAPRINE HCL 10 MG
10 TABLET ORAL 3 TIMES DAILY PRN
COMMUNITY

## 2018-11-28 RX ORDER — ERGOCALCIFEROL 1.25 MG/1
50000 CAPSULE ORAL
COMMUNITY

## 2018-11-28 NOTE — LETTER
November 28, 2018      Royer Ta,   [address] [address] [address]           [facility]  [address] [facility] [address]  Phone: [phone]  Fax: [phone]          Patient: Rom Miner Jr.   MR Number: [id number]   YOB: 1960   Date of Visit: 11/28/2018       Dear Dr. Royer Ta:    Thank you for referring Rom Miner to me for evaluation. Attached you will find relevant portions of my assessment and plan of care.    If you have questions, please do not hesitate to call me. I look forward to following Rom Miner along with you.    Sincerely,    Zeeshan Hair MD    Enclosure  CC:  No Recipients    If you would like to receive this communication electronically, please contact [email or web address] or [phone] to request more information on [employer] access.    For providers and/or their staff who would like to refer a patient to [facility], please contact us through our one-stop-shop provider referral line, [facility], at [phone].    If you feel you have received this communication in error or would no longer like to receive these types of communications, please e-mail [email or web address]

## 2018-11-28 NOTE — PROGRESS NOTES
Subjective:   Patient ID:  Rom Miner Jr. is a 58 y.o. male who presents for cardiac consult of Atrial Fibrillation; Establish Care; Pre-op Exam; and Hypertension      HPI  The patient came in today for cardiac consult of Atrial Fibrillation; Establish Care; Pre-op Exam; and Hypertension    Referring physician: Royer Ta DO   Reason for consult: Afib, HTN    11/28/18  This is a 58 year old male pt with PAF on Eliquis, HTN, ESRD on HD T,H,S, Hep C, PVD, ACD, former smoker presents to establish CV care and have pre-op renal tx eval.    Pt has been on HD for 4 years, sec to uncontrolled HTN, on Eliquis for about 4-5 months. Pt quit smoking earlier this year on his birthday. Dr. Claros is prior cardiologist. He has been on home PD but had an infection so now on HD T,H,S.  Pt complains of leg pain due to neuropathies. Pt does also have claudication symptoms. BP mildly elevated today, but overall at home 130s/70s. He is also on transplant list for kidney and needs pre-op, will have colonoscopy in Dec.     Patient feels no chest pain, no sob, no leg swelling, no PND, no palpitation, no dizziness, no syncope, no CNS symptoms.    Patient has fairly good exercise tolerance. 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     Patient is compliant with medications.    FH - No signific CV disease    ECG 10/3/18  Normal sinus rhythm  Normal ECG  When compared with ECG of 18-JAN-2018 18:51,  Sinus rhythm has replaced Atrial fibrillation  Non-specific change in ST segment in Inferior leads      Past Medical History:   Diagnosis Date    Acute congestive heart failure 1/18/2018    Anemia in chronic kidney disease (CKD)     Anticoagulant long-term use     Arthritis     Atrial fibrillation     Depression     ESRD (end stage renal disease) on dialysis     Hemodialysis patient     Hepatitis C     Hypertension     Incarcerated umbilical hernia     Mild mental retardation     Peritoneal dialysis catheter in place 05/15/2017     Removed 2018    PVD (peripheral vascular disease)     Renal disorder        Past Surgical History:   Procedure Laterality Date    AV FISTULA PLACEMENT      CT guided Liver Biopsy  2017    [facility] [employer] [facility]-Trichrome stain: Positive for significant fibrosis. PAS w/database: Negative for PAS-D resistant intracellular globules. Prussian blue stain for iron: Positive for 2-3+ stainable iron deposition. Fibrous portal expansion: Present. Bridging fibrosis: Present       Social History     Tobacco Use    Smoking status: Former Smoker     Packs/day: 0.50     Types: Cigarettes     Last attempt to quit: 2017     Years since quittin.0    Smokeless tobacco: Never Used   Substance Use Topics    Alcohol use: No    Drug use: No       Family History   Problem Relation Age of Onset    No Known Problems Mother     Cirrhosis Father           Medication List           Accurate as of 18  9:04 AM. If you have any questions, ask your nurse or doctor.               CHANGE how you take these medications    gabapentin 300 MG capsule  Commonly known as:  NEURONTIN  Take 1 capsule (300 mg total) by mouth every evening.  What changed:  when to take this        CONTINUE taking these medications    calcitRIOL 0.5 MCG Cap  Commonly known as:  ROCALTROL     CARDIZEM  mg 24 hr tablet  Generic drug:  diltiaZEM     cyclobenzaprine 10 MG tablet  Commonly known as:  FLEXERIL     DULoxetine 60 MG capsule  Commonly known as:  CYMBALTA  Take 1 capsule (60 mg total) by mouth once daily.     ELIQUIS 5 mg Tab  Generic drug:  apixaban  TAKE 1 TABLET BY MOUTH TWICE DAILY     furosemide 80 MG tablet  Commonly known as:  LASIX  TAKE ONE TABLET BY MOUTH EVERY MORNING     hydrALAZINE 25 MG tablet  Commonly known as:  APRESOLINE  TAKE ONE TABLET BY MOUTH THREE TIMES DAILY     levocetirizine 5 MG tablet  Commonly known as:  XYZAL     losartan 50 MG tablet  Commonly known as:  COZAAR     magnesium oxide 400 mg  "(241.3 mg magnesium) tablet  Commonly known as:  MAG-OX     nebivolol 10 MG Tab  Commonly known as:  BYSTOLIC     PRORENAL ORAL     RENAPLEX-D ORAL     VITAMIN D2 50,000 unit Cap  Generic drug:  ergocalciferol        STOP taking these medications    HYDROcodone-acetaminophen 5-325 mg per tablet  Commonly known as:  NORCO  Stopped by:  Zeeshan Hair Md, MD     RENVELA 800 mg Tab  Generic drug:  sevelamer carbonate  Stopped by:  Zeeshan Hair Md, MD            Review of Systems   Constitutional: Negative.    HENT: Negative.    Eyes: Negative.    Respiratory: Negative.    Cardiovascular: Positive for claudication.   Gastrointestinal: Negative.    Genitourinary: Negative.    Musculoskeletal: Negative.    Skin: Negative.    Neurological: Negative.    Endo/Heme/Allergies: Negative.    Psychiatric/Behavioral: Negative.    All 12 systems otherwise negative.      Wt Readings from Last 3 Encounters:   11/28/18 89.4 kg (197 lb 1.5 oz)   11/12/18 86.6 kg (190 lb 14.7 oz)   10/29/18 84.6 kg (186 lb 8.2 oz)     Temp Readings from Last 3 Encounters:   10/29/18 96 °F (35.6 °C) (Tympanic)   10/08/18 97.3 °F (36.3 °C) (Tympanic)   10/03/18 98.4 °F (36.9 °C) (Oral)     BP Readings from Last 3 Encounters:   11/28/18 (!) 140/80   11/12/18 (!) 160/90   10/29/18 (!) 147/83     Pulse Readings from Last 3 Encounters:   11/28/18 68   11/12/18 66   10/29/18 73       BP (!) 140/80 (BP Method: Large (Manual))   Pulse 68   Ht 6' 2" (1.88 m)   Wt 89.4 kg (197 lb 1.5 oz)   BMI 25.31 kg/m²     Objective:   Physical Exam   Constitutional: He is oriented to person, place, and time. He appears well-developed and well-nourished. No distress.   HENT:   Head: Normocephalic and atraumatic.   Nose: Nose normal.   Mouth/Throat: Oropharynx is clear and moist.   Eyes: Conjunctivae and EOM are normal. No scleral icterus.   Neck: Normal range of motion. Neck supple. No JVD present. No thyromegaly present.   Cardiovascular: Normal rate, regular rhythm, S1 " normal and S2 normal. Exam reveals no gallop, no S3, no S4 and no friction rub.   Murmur heard.  Pulmonary/Chest: Effort normal and breath sounds normal. No stridor. No respiratory distress. He has no wheezes. He has no rales. He exhibits no tenderness.   Abdominal: Soft. Bowel sounds are normal. He exhibits no distension and no mass. There is no tenderness. There is no rebound.   Genitourinary:   Genitourinary Comments: Deferred   Musculoskeletal: Normal range of motion. He exhibits no edema, tenderness or deformity.   Lymphadenopathy:     He has no cervical adenopathy.   Neurological: He is alert and oriented to person, place, and time. He exhibits normal muscle tone. Coordination normal.   Skin: Skin is warm and dry. No rash noted. He is not diaphoretic. No erythema. No pallor.   Psychiatric: He has a normal mood and affect. His behavior is normal. Judgment and thought content normal.   Nursing note and vitals reviewed.      Lab Results   Component Value Date     10/03/2018    K 5.2 (H) 10/03/2018    CL 97 10/03/2018    CO2 30 (H) 10/03/2018    BUN 59 (H) 10/03/2018    CREATININE 11.6 (H) 10/03/2018    CREATININE 14.0 03/20/2017    GLU 94 10/03/2018    MG 2.1 01/18/2018    AST 25 10/03/2018    ALT 16 10/03/2018    ALBUMIN 2.3 (L) 10/03/2018    PROT 6.7 10/03/2018    BILITOT 0.2 10/03/2018    WBC 10.23 10/03/2018    HGB 8.3 (L) 10/03/2018    HCT 26.5 (L) 10/03/2018    MCV 93 10/03/2018     10/03/2018    INR 1.2 01/18/2018    INR 1.1 03/20/2017    TSH 0.632 01/18/2018    CHOL 98 (L) 02/15/2018    HDL 29 (L) 02/15/2018    LDLCALC 54.6 (L) 02/15/2018    TRIG 72 02/15/2018    BNP 3,324 (H) 01/18/2018     Assessment:      1. PAF (paroxysmal atrial fibrillation)    2. PVD (peripheral vascular disease)    3. Hypertension due to end stage renal disease on dialysis    4. Claudication in peripheral vascular disease    5. At risk for coronary artery disease    6. Preop cardiovascular exam        Plan:   1. PAF -  in NSR  - cont Eliquis, BB, CCB  - ok to hold Eliquis 2 days prior to colonoscopy in Dec and resume post procedure     2. Pre-op Renal transplant - [facility]   - 2D ECHO and exercise nuclear stress test ordered  - will risk stratify upon completion and perform annually until transplant     3. HTN  - cont meds  - discussed low salt diet    4. Claudication  - LE [place]UANITA/Dopplers    5. ESRD  - cont HD per renal      ADDENDUM 12/13/18  Pre-OP CV evaluation prior to renal transplant  Low periop risk of CV events for moderate risk procedure.  Good functional and exercise capacity.  No chest pain, active arrhythmia and CHF symptoms.  Ok to proceed to the scheduled surgery without further cardiac study.  Reviewed recent stress and 2D ECHO  Repeat in one year     Thank you for allowing me to participate in this patient's care. Please do not hesitate to contact me with any questions or concerns. Consult note has been forwarded to the referral physician.     Zeeshan Hair MD  Cardiovascular Disease  [facility], [place]  [phone] (P)

## 2018-12-04 ENCOUNTER — TELEPHONE (OUTPATIENT)
Dept: GASTROENTEROLOGY | Facility: CLINIC | Age: 58
End: 2018-12-04

## 2018-12-04 NOTE — TELEPHONE ENCOUNTER
----- Message from Zeeshan Hair MD sent at 12/3/2018 11:26 AM CST -----  Regarding: RE: Anticoag  Hi    It is Ok to hold Eliquis 2 days prior to colonoscopy and resume post procedure thanks.    - Dr. Hair  ----- Message -----  From: Andreia Choi LPN  Sent: 12/3/2018  10:26 AM  To: Zeeshan Hair MD  Subject: Anticoag                                         This pt has been scheduled for colonoscopy on 12/20/2018 and Marium Foote NP would like to know if OK with you to stop his Eliquis and for how long please.  Andreia Gutiérrez / GI Lead

## 2018-12-04 NOTE — TELEPHONE ENCOUNTER
Called and spoke to pt's wife - told ok per Dr. Hair to stop his Eliquis for 2 days prior to his colonoscopy.  She verbalized her understanding of this instruction.

## 2018-12-05 ENCOUNTER — TELEPHONE (OUTPATIENT)
Dept: ENDOSCOPY | Facility: HOSPITAL | Age: 58
End: 2018-12-05

## 2018-12-10 RX ORDER — FUROSEMIDE 80 MG/1
80 TABLET ORAL EVERY MORNING
Qty: 30 TABLET | Refills: 3 | Status: SHIPPED | OUTPATIENT
Start: 2018-12-10

## 2018-12-10 RX ORDER — HYDRALAZINE HYDROCHLORIDE 25 MG/1
25 TABLET, FILM COATED ORAL 3 TIMES DAILY
Qty: 90 TABLET | Refills: 2 | Status: SHIPPED | OUTPATIENT
Start: 2018-12-10

## 2018-12-12 ENCOUNTER — CLINICAL SUPPORT (OUTPATIENT)
Dept: CARDIOLOGY | Facility: CLINIC | Age: 58
End: 2018-12-12
Attending: INTERNAL MEDICINE
Payer: COMMERCIAL

## 2018-12-12 ENCOUNTER — HOSPITAL ENCOUNTER (OUTPATIENT)
Dept: RADIOLOGY | Facility: HOSPITAL | Age: 58
Discharge: HOME | End: 2018-12-12
Attending: INTERNAL MEDICINE
Payer: COMMERCIAL

## 2018-12-12 DIAGNOSIS — Z91.89 AT RISK FOR CORONARY ARTERY DISEASE: ICD-10-CM

## 2018-12-12 DIAGNOSIS — I73.9 CLAUDICATION IN PERIPHERAL VASCULAR DISEASE: ICD-10-CM

## 2018-12-12 DIAGNOSIS — Z01.810 PREOP CARDIOVASCULAR EXAM: ICD-10-CM

## 2018-12-12 LAB
DIASTOLIC DYSFUNCTION: NO
DIASTOLIC DYSFUNCTION: YES
ESTIMATED PA SYSTOLIC PRESSURE: 41.41
RETIRED EF AND QEF - SEE NOTES: 50 (ref 55–65)
TRICUSPID VALVE REGURGITATION: ABNORMAL

## 2018-12-12 PROCEDURE — 78452 HT MUSCLE IMAGE SPECT MULT: CPT | Mod: TC,PO

## 2018-12-12 PROCEDURE — 93306 TTE W/DOPPLER COMPLETE: CPT | Mod: S$GLB,,, | Performed by: INTERNAL MEDICINE

## 2018-12-12 PROCEDURE — 93922 UPR/L XTREMITY ART 2 LEVELS: CPT | Mod: S$GLB,,, | Performed by: INTERNAL MEDICINE

## 2018-12-12 PROCEDURE — 93015 CV STRESS TEST SUPVJ I&R: CPT | Mod: S$GLB,,, | Performed by: INTERNAL MEDICINE

## 2018-12-12 PROCEDURE — 93925 LOWER EXTREMITY STUDY: CPT | Mod: S$GLB,,, | Performed by: INTERNAL MEDICINE

## 2018-12-12 PROCEDURE — 78452 HT MUSCLE IMAGE SPECT MULT: CPT | Mod: 26,,, | Performed by: INTERNAL MEDICINE

## 2018-12-21 ENCOUNTER — HOSPITAL ENCOUNTER (EMERGENCY)
Facility: HOSPITAL | Age: 58
End: 2018-12-21
Attending: EMERGENCY MEDICINE
Payer: COMMERCIAL

## 2018-12-21 VITALS
HEART RATE: 122 BPM | RESPIRATION RATE: 18 BRPM | DIASTOLIC BLOOD PRESSURE: 88 MMHG | OXYGEN SATURATION: 100 % | BODY MASS INDEX: 27.2 KG/M2 | TEMPERATURE: 98 F | WEIGHT: 211.88 LBS | SYSTOLIC BLOOD PRESSURE: 185 MMHG

## 2018-12-21 DIAGNOSIS — Z01.818 ENCOUNTER FOR INTUBATION: ICD-10-CM

## 2018-12-21 DIAGNOSIS — Z79.01 ANTICOAGULATED: ICD-10-CM

## 2018-12-21 DIAGNOSIS — R41.89 UNRESPONSIVE: ICD-10-CM

## 2018-12-21 DIAGNOSIS — R40.2431 GLASGOW COMA SCALE TOTAL SCORE 3-8, IN THE FIELD (EMT OR AMBULANCE): ICD-10-CM

## 2018-12-21 DIAGNOSIS — J96.90 RESPIRATORY FAILURE: ICD-10-CM

## 2018-12-21 DIAGNOSIS — I62.9 ACUTE SPONTANEOUS INTRAPARENCHYMAL INTRACRANIAL HEMORRHAGE: Primary | ICD-10-CM

## 2018-12-21 DIAGNOSIS — I16.1 HYPERTENSIVE EMERGENCY: ICD-10-CM

## 2018-12-21 LAB
ALBUMIN SERPL BCP-MCNC: 3.4 G/DL
ALLENS TEST: ABNORMAL
ALP SERPL-CCNC: 41 U/L
ALT SERPL W/O P-5'-P-CCNC: 21 U/L
ANION GAP SERPL CALC-SCNC: 14 MMOL/L
APTT BLDCRRT: 21.6 SEC
AST SERPL-CCNC: 23 U/L
BASOPHILS # BLD AUTO: 0.02 K/UL
BASOPHILS NFR BLD: 0.1 %
BILIRUB SERPL-MCNC: 0.5 MG/DL
BNP SERPL-MCNC: 4184 PG/ML
BUN SERPL-MCNC: 46 MG/DL
CALCIUM SERPL-MCNC: 9.9 MG/DL
CHLORIDE SERPL-SCNC: 104 MMOL/L
CO2 SERPL-SCNC: 23 MMOL/L
CREAT SERPL-MCNC: 8.1 MG/DL
DELSYS: ABNORMAL
DIFFERENTIAL METHOD: ABNORMAL
EOSINOPHIL # BLD AUTO: 0.2 K/UL
EOSINOPHIL NFR BLD: 1.4 %
ERYTHROCYTE [DISTWIDTH] IN BLOOD BY AUTOMATED COUNT: 16.2 %
EST. GFR  (AFRICAN AMERICAN): 7.5 ML/MIN/1.73 M^2
EST. GFR  (NON AFRICAN AMERICAN): 6.5 ML/MIN/1.73 M^2
ETHANOL SERPL-MCNC: <10 MG/DL
FIO2: 50
GLUCOSE SERPL-MCNC: 136 MG/DL
HCO3 UR-SCNC: 26.4 MMOL/L (ref 24–28)
HCT VFR BLD AUTO: 31.5 %
HGB BLD-MCNC: 10.1 G/DL
INR PPP: 1
LACTATE SERPL-SCNC: 1.5 MMOL/L
LYMPHOCYTES # BLD AUTO: 5.4 K/UL
LYMPHOCYTES NFR BLD: 38 %
MAGNESIUM SERPL-MCNC: 2.6 MG/DL
MCH RBC QN AUTO: 31.3 PG
MCHC RBC AUTO-ENTMCNC: 32.1 G/DL
MCV RBC AUTO: 98 FL
MODE: ABNORMAL
MONOCYTES # BLD AUTO: 1.9 K/UL
MONOCYTES NFR BLD: 13.1 %
NEUTROPHILS # BLD AUTO: 6.7 K/UL
NEUTROPHILS NFR BLD: 47.1 %
PCO2 BLDA: 39.4 MMHG (ref 35–45)
PH SMN: 7.43 [PH] (ref 7.35–7.45)
PLATELET # BLD AUTO: 156 K/UL
PMV BLD AUTO: 10.2 FL
PO2 BLDA: 126 MMHG (ref 80–100)
POC BE: 2 MMOL/L
POC SATURATED O2: 99 % (ref 95–100)
POTASSIUM SERPL-SCNC: 4.9 MMOL/L
PROT SERPL-MCNC: 8.2 G/DL
PROTHROMBIN TIME: 10.8 SEC
RBC # BLD AUTO: 3.23 M/UL
SAMPLE: ABNORMAL
SITE: ABNORMAL
SODIUM SERPL-SCNC: 141 MMOL/L
TROPONIN I SERPL DL<=0.01 NG/ML-MCNC: 0.06 NG/ML
WBC # BLD AUTO: 14.23 K/UL

## 2018-12-21 PROCEDURE — 96374 THER/PROPH/DIAG INJ IV PUSH: CPT

## 2018-12-21 PROCEDURE — 83605 ASSAY OF LACTIC ACID: CPT

## 2018-12-21 PROCEDURE — 85025 COMPLETE CBC W/AUTO DIFF WBC: CPT

## 2018-12-21 PROCEDURE — 63600531 PHARM REV CODE 636 NO ALT 250 W HCPCS: Mod: JW | Performed by: EMERGENCY MEDICINE

## 2018-12-21 PROCEDURE — 83735 ASSAY OF MAGNESIUM: CPT

## 2018-12-21 PROCEDURE — 93010 ELECTROCARDIOGRAM REPORT: CPT | Mod: ,,, | Performed by: INTERNAL MEDICINE

## 2018-12-21 PROCEDURE — 94002 VENT MGMT INPAT INIT DAY: CPT

## 2018-12-21 PROCEDURE — 27100108

## 2018-12-21 PROCEDURE — 93005 ELECTROCARDIOGRAM TRACING: CPT

## 2018-12-21 PROCEDURE — 96375 TX/PRO/DX INJ NEW DRUG ADDON: CPT

## 2018-12-21 PROCEDURE — 63600175 PHARM REV CODE 636 W HCPCS: Performed by: EMERGENCY MEDICINE

## 2018-12-21 PROCEDURE — C9132 KCENTRA, PER I.U.: HCPCS | Mod: JW | Performed by: EMERGENCY MEDICINE

## 2018-12-21 PROCEDURE — 83880 ASSAY OF NATRIURETIC PEPTIDE: CPT

## 2018-12-21 PROCEDURE — 31500 INSERT EMERGENCY AIRWAY: CPT

## 2018-12-21 PROCEDURE — 82803 BLOOD GASES ANY COMBINATION: CPT

## 2018-12-21 PROCEDURE — 25000003 PHARM REV CODE 250

## 2018-12-21 PROCEDURE — 99291 CRITICAL CARE FIRST HOUR: CPT | Mod: 25

## 2018-12-21 PROCEDURE — 85610 PROTHROMBIN TIME: CPT

## 2018-12-21 PROCEDURE — 63600175 PHARM REV CODE 636 W HCPCS

## 2018-12-21 PROCEDURE — 85730 THROMBOPLASTIN TIME PARTIAL: CPT

## 2018-12-21 PROCEDURE — 80053 COMPREHEN METABOLIC PANEL: CPT

## 2018-12-21 PROCEDURE — 25000003 PHARM REV CODE 250: Performed by: EMERGENCY MEDICINE

## 2018-12-21 PROCEDURE — 36600 WITHDRAWAL OF ARTERIAL BLOOD: CPT

## 2018-12-21 PROCEDURE — 99900035 HC TECH TIME PER 15 MIN (STAT)

## 2018-12-21 PROCEDURE — 80320 DRUG SCREEN QUANTALCOHOLS: CPT

## 2018-12-21 PROCEDURE — 84484 ASSAY OF TROPONIN QUANT: CPT

## 2018-12-21 RX ORDER — ROCURONIUM BROMIDE 10 MG/ML
100 INJECTION, SOLUTION INTRAVENOUS ONCE
Status: COMPLETED | OUTPATIENT
Start: 2018-12-21 | End: 2018-12-21

## 2018-12-21 RX ORDER — ETOMIDATE 2 MG/ML
20 INJECTION INTRAVENOUS
Status: COMPLETED | OUTPATIENT
Start: 2018-12-21 | End: 2018-12-21

## 2018-12-21 RX ORDER — PROPOFOL 10 MG/ML
20 INJECTION, EMULSION INTRAVENOUS CONTINUOUS
Status: DISCONTINUED | OUTPATIENT
Start: 2018-12-21 | End: 2018-12-21 | Stop reason: HOSPADM

## 2018-12-21 RX ORDER — NICARDIPINE HYDROCHLORIDE 0.2 MG/ML
5 INJECTION INTRAVENOUS CONTINUOUS
Status: DISCONTINUED | OUTPATIENT
Start: 2018-12-21 | End: 2018-12-21 | Stop reason: HOSPADM

## 2018-12-21 RX ORDER — NALOXONE HCL 0.4 MG/ML
2 VIAL (ML) INJECTION
Status: COMPLETED | OUTPATIENT
Start: 2018-12-21 | End: 2018-12-21

## 2018-12-21 RX ORDER — PROPOFOL 10 MG/ML
INJECTION, EMULSION INTRAVENOUS
Status: DISCONTINUED
Start: 2018-12-21 | End: 2018-12-21 | Stop reason: HOSPADM

## 2018-12-21 RX ADMIN — Medication 4876 UNITS: at 10:12

## 2018-12-21 RX ADMIN — ROCURONIUM BROMIDE 100 MG: 10 INJECTION, SOLUTION INTRAVENOUS at 09:12

## 2018-12-21 RX ADMIN — NALOXONE HYDROCHLORIDE 2 MG: 0.4 INJECTION, SOLUTION INTRAMUSCULAR; INTRAVENOUS; SUBCUTANEOUS at 09:12

## 2018-12-21 RX ADMIN — ETOMIDATE 20 MG: 2 INJECTION, SOLUTION INTRAVENOUS at 09:12

## 2018-12-21 RX ADMIN — NICARDIPINE HYDROCHLORIDE 5 MG/HR: 0.2 INJECTION, SOLUTION INTRAVENOUS at 09:12

## 2018-12-21 RX ADMIN — PROPOFOL 20 MCG/KG/MIN: 10 INJECTION, EMULSION INTRAVENOUS at 09:12

## 2018-12-21 NOTE — ED NOTES
Marisa with [facility] notified of need for neurosurgery for brain bleed. Marisa will with speak with Dr. Dhaliwal for guidance.

## 2018-12-21 NOTE — ED NOTES
Pt moved to [facility]. Dr. Osorio aware of pts blood pressure, cardene maxed out, propofol titrated up to help lower Bp. No further orders at this time.

## 2018-12-21 NOTE — ED NOTES
Pt arrived to ER, report by [facility] that pt was a possible drug overdose. Received 4mg Narcan IVP with no change in status. RR 8, snoring respirations, no purposeful movement. Pupils pinpoint and non reactive.

## 2018-12-21 NOTE — ED NOTES
Pt unresponsive after being found outside of a [employer] in [place]. Pt arrived with snoring respirations after being given 4mg of IVP Narcan. No change from Narcan.     Level of Consciousness: The patient is unresponsive.   Appearance: Clothing appropriate.    Skin: Shunt noted to upper left arm.   Musculoskeletal: Unable to assess.   Respiratory: Currently intubated and on ventilator.    Cardiac: Hypertensive, cap refill normal.    Abdomen: No distension noted.   Neurologic: Unable to assess.     Pt had an envelope with $900 cash in his pocked. Counted with Valarie RODRIGUEZ as witness. Envelope given to pt in presence of her cousin Tanya.

## 2018-12-21 NOTE — ED NOTES
Pt arrived via ambulance, unresponsive, RR 8 with snoring respirations. Patient intubated with 8.0 ETT secured with commercial   tube mcclelland 23 cm at the teeth. Pt placed on vent with the following settings: AC, vt 750, Peep +5, rate 15, fio2 40. ABG drawn and results given to MD. Patient will be transported via [facility].

## 2018-12-21 NOTE — ED PROVIDER NOTES
Encounter Date: 2018       History     Chief Complaint   Patient presents with    Unresponsive     Patient is a 58-year-old male, who presents today with altered mental status.  At the time of arrival, patient is unable to provide any history.  GCS 3.  Patient's medical history is unknown except dialysis.  Patient did not present with any family.  EMS states that patient was at the gas station.  Patient reportedly drove himself to the gas station, and then became unresponsive at the gas station.  EMS gave 4 mg of Narcan en route without improvement.  Glucose was reportedly within normal limits.           Review of patient's allergies indicates:  No Known Allergies  Past Medical History:   Diagnosis Date    Acute congestive heart failure 2018    Anemia in chronic kidney disease (CKD)     Anticoagulant long-term use     Arthritis     Atrial fibrillation     Depression     ESRD (end stage renal disease) on dialysis     Hemodialysis patient     Hepatitis C     Hypertension     Incarcerated umbilical hernia     Mild mental retardation     Peritoneal dialysis catheter in place 05/15/2017    Removed 2018    PVD (peripheral vascular disease)     Renal disorder      Past Surgical History:   Procedure Laterality Date    AV FISTULA PLACEMENT      CT guided Liver Biopsy  2017    [facility] [employer]-Trichrome stain: Positive for significant fibrosis. PAS w/database: Negative for PAS-D resistant intracellular globules. Prussian blue stain for iron: Positive for 2-3+ stainable iron deposition. Fibrous portal expansion: Present. Bridging fibrosis: Present     Family History   Problem Relation Age of Onset    No Known Problems Mother     Cirrhosis Father      Social History     Tobacco Use    Smoking status: Former Smoker     Packs/day: 0.50     Types: Cigarettes     Last attempt to quit: 2017     Years since quittin.0    Smokeless tobacco: Never Used   Substance Use Topics     Alcohol use: No    Drug use: No     Review of Systems   Unable to perform ROS: Acuity of condition       Physical Exam     Initial Vitals [12/21/18 0902]   BP Pulse Resp Temp SpO2   (!) 236/116 92 (!) 8 97.7 °F (36.5 °C) 100 %      MAP       --         Physical Exam    Nursing note and vitals reviewed.  Constitutional: He appears well-developed and well-nourished. No distress.   HENT:   Head: Normocephalic and atraumatic.   Eyes: Conjunctivae are normal.   Pupils 3-4 mm bilaterally and fixed   Neck: Neck supple. No tracheal deviation present.   Cardiovascular: Regular rhythm, normal heart sounds and intact distal pulses.   tachycardic   Pulmonary/Chest: Breath sounds normal.   Sonorous respirations   Abdominal: Soft. He exhibits no distension. There is no tenderness. There is no rebound and no guarding.   Musculoskeletal: He exhibits no edema or tenderness.   Neurological:   GCS 3   Skin: Skin is warm. No rash noted. No erythema.         ED Course   Critical Care  Date/Time: 12/21/2018 10:04 AM  Performed by: León Osorio MD  Authorized by: León Osorio MD   Direct patient critical care time: 15 minutes  Additional history critical care time: 5 minutes  Ordering / reviewing critical care time: 10 minutes  Documentation critical care time: 10 minutes  Consulting other physicians critical care time: 10 minutes  Consult with family critical care time: 5 minutes  Total critical care time (exclusive of procedural time) : 55 minutes  Critical care time was exclusive of separately billable procedures and treating other patients and teaching time.  Critical care was necessary to treat or prevent imminent or life-threatening deterioration of the following conditions: CNS failure or compromise.  Critical care was time spent personally by me on the following activities: blood draw for specimens, discussions with consultants, evaluation of patient's response to treatment, obtaining history from patient or surrogate,  ordering and review of laboratory studies, pulse oximetry, review of old charts, ventilator management, re-evaluation of patient's condition, ordering and review of radiographic studies, ordering and performing treatments and interventions, examination of patient and development of treatment plan with patient or surrogate.    Intubation  Date/Time: 12/21/2018 10:05 AM  Performed by: León Osorio MD  Authorized by: León Osorio MD   Indications: airway protection  Intubation method: direct  Patient status: paralyzed (RSI)  Preoxygenation: nonrebreather mask  Sedatives: etomidate  Paralytic: rocuronium  Laryngoscope size: Mac 4  Tube size: 8.0 mm  Tube type: cuffed  Number of attempts: 1  Cords visualized: yes  Post-procedure assessment: chest rise and CO2 detector  Breath sounds: clear  Cuff inflated: yes  ETT to teeth: 23 cm  Chest x-ray interpreted by me.  Chest x-ray findings: endotracheal tube in appropriate position  Patient tolerance: Patient tolerated the procedure well with no immediate complications        Labs Reviewed   CBC W/ AUTO DIFFERENTIAL - Abnormal; Notable for the following components:       Result Value    WBC 14.23 (*)     RBC 3.23 (*)     Hemoglobin 10.1 (*)     Hematocrit 31.5 (*)     MCH 31.3 (*)     RDW 16.2 (*)     Lymph # 5.4 (*)     Mono # 1.9 (*)     All other components within normal limits   COMPREHENSIVE METABOLIC PANEL - Abnormal; Notable for the following components:    Glucose 136 (*)     BUN, Bld 46 (*)     Creatinine 8.1 (*)     Albumin 3.4 (*)     Alkaline Phosphatase 41 (*)     eGFR if  7.5 (*)     eGFR if non  6.5 (*)     All other components within normal limits   B-TYPE NATRIURETIC PEPTIDE - Abnormal; Notable for the following components:    BNP 4,184 (*)     All other components within normal limits   TROPONIN I - Abnormal; Notable for the following components:    Troponin I 0.060 (*)     All other components within normal limits   ISTAT  PROCEDURE - Abnormal; Notable for the following components:    POC PO2 126 (*)     All other components within normal limits   ALCOHOL,MEDICAL (ETHANOL)   APTT   PROTIME-INR   MAGNESIUM   LACTIC ACID, PLASMA     Results for orders placed or performed during the hospital encounter of 12/21/18   CBC auto differential   Result Value Ref Range    WBC 14.23 (H) 3.90 - 12.70 K/uL    RBC 3.23 (L) 4.60 - 6.20 M/uL    Hemoglobin 10.1 (L) 14.0 - 18.0 g/dL    Hematocrit 31.5 (L) 40.0 - 54.0 %    MCV 98 82 - 98 fL    MCH 31.3 (H) 27.0 - 31.0 pg    MCHC 32.1 32.0 - 36.0 g/dL    RDW 16.2 (H) 11.5 - 14.5 %    Platelets 156 150 - 350 K/uL    MPV 10.2 9.2 - 12.9 fL    Gran # (ANC) 6.7 1.8 - 7.7 K/uL    Lymph # 5.4 (H) 1.0 - 4.8 K/uL    Mono # 1.9 (H) 0.3 - 1.0 K/uL    Eos # 0.2 0.0 - 0.5 K/uL    Baso # 0.02 0.00 - 0.20 K/uL    Gran% 47.1 38.0 - 73.0 %    Lymph% 38.0 18.0 - 48.0 %    Mono% 13.1 4.0 - 15.0 %    Eosinophil% 1.4 0.0 - 8.0 %    Basophil% 0.1 0.0 - 1.9 %    Differential Method Automated    Comprehensive metabolic panel   Result Value Ref Range    Sodium 141 136 - 145 mmol/L    Potassium 4.9 3.5 - 5.1 mmol/L    Chloride 104 95 - 110 mmol/L    CO2 23 23 - 29 mmol/L    Glucose 136 (H) 70 - 110 mg/dL    BUN, Bld 46 (H) 6 - 20 mg/dL    Creatinine 8.1 (H) 0.5 - 1.4 mg/dL    Calcium 9.9 8.7 - 10.5 mg/dL    Total Protein 8.2 6.0 - 8.4 g/dL    Albumin 3.4 (L) 3.5 - 5.2 g/dL    Total Bilirubin 0.5 0.1 - 1.0 mg/dL    Alkaline Phosphatase 41 (L) 55 - 135 U/L    AST 23 10 - 40 U/L    ALT 21 10 - 44 U/L    Anion Gap 14 8 - 16 mmol/L    eGFR if African American 7.5 (A) >60 mL/min/1.73 m^2    eGFR if non African American 6.5 (A) >60 mL/min/1.73 m^2   Brain natriuretic peptide   Result Value Ref Range    BNP 4,184 (H) 0 - 99 pg/mL   Troponin I   Result Value Ref Range    Troponin I 0.060 (H) 0.000 - 0.026 ng/mL   Ethanol   Result Value Ref Range    Alcohol, Medical, Serum <10 <10 mg/dL   APTT   Result Value Ref Range    aPTT 21.6 21.0 -  32.0 sec   Protime-INR   Result Value Ref Range    Prothrombin Time 10.8 9.0 - 12.5 sec    INR 1.0 0.8 - 1.2   Magnesium   Result Value Ref Range    Magnesium 2.6 1.6 - 2.6 mg/dL   Lactic acid, plasma   Result Value Ref Range    Lactate (Lactic Acid) 1.5 0.5 - 2.2 mmol/L   ISTAT PROCEDURE   Result Value Ref Range    POC PH 7.434 7.35 - 7.45    POC PCO2 39.4 35 - 45 mmHg    POC PO2 126 (H) 80 - 100 mmHg    POC HCO3 26.4 24 - 28 mmol/L    POC BE 2 -2 to 2 mmol/L    POC SATURATED O2 99 95 - 100 %    Sample ARTERIAL     Site RR     Allens Test Pass     DelSys Adult Vent     Mode AC/PRVC     FiO2 50               Imaging Results          X-Ray Chest AP Portable (Final result)  Result time 12/21/18 09:40:33    Final result by ROE Chatterjee Sr., MD (12/21/18 09:40:33)                 Impression:      1. There has been interval placement of an endotracheal tube. The tip is located 32 mm superior to the sunita.  2. There has been interval development of a moderate amount of interstitial and alveolar opacities seen in both lungs.  This is characteristic of pulmonary edema.  3. There is mild cardiomegaly.  .      Electronically signed by: Fco Chatterjee MD  Date:    12/21/2018  Time:    09:40             Narrative:    EXAMINATION:  XR CHEST AP PORTABLE    CLINICAL HISTORY:  Respiratory failure, unspecified, unspecified whether with hypoxia or hypercapnia    COMPARISON:  10/03/2018    FINDINGS:  There has been interval placement of an endotracheal tube.  The tip is located 32 mm superior to the sunita.  There is mild cardiomegaly.  There has been interval development of a moderate amount of interstitial and alveolar opacities seen in both lungs.  There is no pneumothorax.  The costophrenic angles are sharp.                               CT Head Without Contrast (Final result)  Result time 12/21/18 09:41:40    Final result by Fredy Lopez MD (12/21/18 09:41:40)                 Impression:      6 x 3.5 x 5.2 cm acute  intraparenchymal hematoma centered in the left basal ganglia, with associated acute blood distending the lateral ventricles, 3rd ventricle, and 4th ventricle. There is associated 15 mm of left-to-right midline shift, subfalcine herniation, and early left uncal herniation. No tonsillar herniation.  Mild effacement of the prepontine cistern. Moderate effacement of the suprasellar cistern. Cerebral edema noted.    Findings discussed via telephone with the ER physician Dr. Osorio 09:32 12/21/2018.    All CT scans at this facility use dose modulation, iterative reconstruction, and/or weight based dosing when appropriate to reduce radiation dose to as low as reasonably achievable.      Electronically signed by: Fredy Lopez  Date:    12/21/2018  Time:    09:41             Narrative:    EXAMINATION:  CT HEAD WITHOUT CONTRAST    CLINICAL HISTORY:  Confusion/delirium, altered LOC, unexplained;    TECHNIQUE:  Low dose axial CT images obtained throughout the head without intravenous contrast. Sagittal and coronal reconstructions were performed.    COMPARISON:  10/03/2018    FINDINGS:  Intracranial compartment:    6 x 3.5 x 5.2 cm acute intraparenchymal hematoma centered in the left basal ganglia, with associated acute blood distending the lateral ventricles, 3rd ventricle, and 4th ventricle.  There is associated 15 mm of left-to-right midline shift, subfalcine herniation, and early left uncal herniation.  No tonsillar herniation.  Mild effacement of the prepontine cistern.  Moderate effacement of the suprasellar cistern.  Cerebral edema noted.    Atherosclerotic calcifications noted.    Skull/extracranial contents (limited evaluation): No fracture. Mastoid air cells and paranasal sinuses are essentially clear.                              EKG:  Sinus tachycardia with a rate of 124, normal axis, normal SD interval and QRS interval; QTC interval 508; T-wave inversions in inferior leads and lead V6; ST depression in precordial  leads; no ST elevation    Medications   etomidate injection 20 mg (20 mg Intravenous Given 12/21/18 0904)   human prothrombin complex (PCC) (KCENTRA) 500 unit (400-620 unit) injection 4,876 Units (4,876 Units Intravenous Given 12/21/18 1010)   naloxone 0.4 mg/mL injection 2 mg (2 mg Intravenous Given 12/21/18 0901)   rocuronium injection 100 mg (100 mg Intravenous Given 12/21/18 0904)   - propofol    Vitals:    12/21/18 1004 12/21/18 1006 12/21/18 1007 12/21/18 1014   BP: (!) 197/104  (!) 191/93 (!) 185/88   BP Location:       Patient Position:       Pulse: (!) 123 (!) 123 (!) 123 (!) 122   Resp: 16 18 15 18   Temp:       TempSrc:       SpO2: 100% 100% 100% 100%   Weight:         10:13 AM: [facility] here. [facility] has accepted. Kcentra running. BP improving but not at goal. Increasing Cardene and propofol now         Medical Decision Making:   Patient presented with a GCS of 3 and severely hypertensive.  Suspicion for intracranial hemorrhage. Patient was intubated to be safely taken to CT scan.  CT revealed large intraparenchymal hemorrhage with 15 mm of midline shift.  Chart review showed the patient was on Eliquis.  Kcentra and Cardene ordered and given here in the emergency department. Arrangements made for STAT transfer. Spoke with Neurosurgery Dr. Salgado at [facility], who stated there was about 95% chance of death, had no additional interventions to recommend at this time other than agreeing with kcentra, cardene, and transfer to nearest NSx facility; Dr. Salgado agreed that patient would benefit with transfer to nearest center with Neurosurgery capabilities; [facility] is the nearest center with Neurosurgery capabilities; patient will need transfer since we do not have neurosurgery capabilities; I spoke with family and family understands the need for transfer; Dr. Carrizales is the accepting physician at [facility]; patient will be transferred Via air med with mechanical ventilation, IV  medications infusing, and cardiorespiratory monitoring                      Clinical Impression:   Diagnosis:  Respiratory failure.  Acute spontaneous intraparenchymal intracranial hemorrhage.  Coma.  Anticoagulated.  Hypertensive emergency  Disposition:  Transfer to [facility] in critical condition                             León Osorio MD  12/21/18 1014       León Osorio MD  12/21/18 1622       León Osorio MD  12/21/18 1621

## 2018-12-28 LAB — POCT GLUCOSE: 134 MG/DL (ref 70–110)

## 2023-08-14 NOTE — ED NOTES
Pt sitting on bed.  RR equal and unlabored.  C/O pain to right elbow that runs down to hand.  Pt states a metal door slammed on his elbow 1 week ago at work.  Since then he's had increased pain & swelling to entire lower arm & decreased strength.   Right radial pulse weaker than left radial (ERMD aware)  AV shunt to left upper arm.  Had been taking Advil & applying ice  without relief.   Will continue to monitor.Patient verbally verified and Spelled Full Name and Date of Birth. Patient verbally verified and Spelled Full Name and Date of Birth. LOC: The patient is awake, alert and aware of environment with an appropriate affect, the patient is oriented x 3 and speaking appropriately.  APPEARANCE: Patient resting comfortably and in no acute distress, patient is clean and well groomed, patient's clothing is properly fastened.  HEENT: Brief WNL  SKIN: Brief WNL.   MUSCULOSKELETAL: positive for weakness  & edema to right lower arm  RESPIRATORY: chest clear thomas  CARDIAC: Sinus at 84/min, no chest pain  GASTRO: Brief WNL  : Dialysis x past 3.5 yrs, voiding small amts several times daily  Peripheral Vasc: AV shunt to left upper arm /positive thrill  NEURO: Brief WNL  PSYCH: Brief WNL   Xelgeorgettez Pregnancy And Lactation Text: This medication is Pregnancy Category D and is not considered safe during pregnancy.  The risk during breast feeding is also uncertain.